# Patient Record
Sex: MALE | Race: WHITE
[De-identification: names, ages, dates, MRNs, and addresses within clinical notes are randomized per-mention and may not be internally consistent; named-entity substitution may affect disease eponyms.]

---

## 2023-03-23 ENCOUNTER — HOSPITAL ENCOUNTER (INPATIENT)
Dept: HOSPITAL 47 - EC | Age: 73
LOS: 2 days | Discharge: HOME | DRG: 180 | End: 2023-03-25
Attending: INTERNAL MEDICINE | Admitting: INTERNAL MEDICINE
Payer: MEDICARE

## 2023-03-23 DIAGNOSIS — Z79.899: ICD-10-CM

## 2023-03-23 DIAGNOSIS — Z77.090: ICD-10-CM

## 2023-03-23 DIAGNOSIS — C79.31: ICD-10-CM

## 2023-03-23 DIAGNOSIS — G93.6: ICD-10-CM

## 2023-03-23 DIAGNOSIS — Z86.73: ICD-10-CM

## 2023-03-23 DIAGNOSIS — J44.9: ICD-10-CM

## 2023-03-23 DIAGNOSIS — J90: ICD-10-CM

## 2023-03-23 DIAGNOSIS — C34.90: Primary | ICD-10-CM

## 2023-03-23 DIAGNOSIS — Z79.82: ICD-10-CM

## 2023-03-23 DIAGNOSIS — I08.1: ICD-10-CM

## 2023-03-23 LAB
ALBUMIN SERPL-MCNC: 3.9 G/DL (ref 3.5–5)
ALP SERPL-CCNC: 105 U/L (ref 38–126)
ALT SERPL-CCNC: 14 U/L (ref 4–49)
ANION GAP SERPL CALC-SCNC: 7 MMOL/L
APTT BLD: 25.4 SEC (ref 22–30)
AST SERPL-CCNC: 26 U/L (ref 17–59)
BASOPHILS # BLD AUTO: 0.1 K/UL (ref 0–0.2)
BASOPHILS NFR BLD AUTO: 1 %
BUN SERPL-SCNC: 11 MG/DL (ref 9–20)
CALCIUM SPEC-MCNC: 8.7 MG/DL (ref 8.4–10.2)
CHLORIDE SERPL-SCNC: 106 MMOL/L (ref 98–107)
CO2 SERPL-SCNC: 23 MMOL/L (ref 22–30)
EOSINOPHIL # BLD AUTO: 0.2 K/UL (ref 0–0.7)
EOSINOPHIL NFR BLD AUTO: 2 %
ERYTHROCYTE [DISTWIDTH] IN BLOOD BY AUTOMATED COUNT: 4.81 M/UL (ref 4.3–5.9)
ERYTHROCYTE [DISTWIDTH] IN BLOOD: 13.6 % (ref 11.5–15.5)
GLUCOSE SERPL-MCNC: 101 MG/DL (ref 74–99)
HCT VFR BLD AUTO: 44.9 % (ref 39–53)
HGB BLD-MCNC: 14.7 GM/DL (ref 13–17.5)
INR PPP: 1 (ref ?–1.2)
LYMPHOCYTES # SPEC AUTO: 2.2 K/UL (ref 1–4.8)
LYMPHOCYTES NFR SPEC AUTO: 24 %
MCH RBC QN AUTO: 30.6 PG (ref 25–35)
MCHC RBC AUTO-ENTMCNC: 32.8 G/DL (ref 31–37)
MCV RBC AUTO: 93.4 FL (ref 80–100)
MONOCYTES # BLD AUTO: 0.6 K/UL (ref 0–1)
MONOCYTES NFR BLD AUTO: 6 %
NEUTROPHILS # BLD AUTO: 5.9 K/UL (ref 1.3–7.7)
NEUTROPHILS NFR BLD AUTO: 65 %
PLATELET # BLD AUTO: 293 K/UL (ref 150–450)
POTASSIUM SERPL-SCNC: 4.6 MMOL/L (ref 3.5–5.1)
PROT SERPL-MCNC: 7.4 G/DL (ref 6.3–8.2)
PT BLD: 10.2 SEC (ref 9–12)
SODIUM SERPL-SCNC: 136 MMOL/L (ref 137–145)
WBC # BLD AUTO: 9.1 K/UL (ref 3.8–10.6)

## 2023-03-23 PROCEDURE — 94760 N-INVAS EAR/PLS OXIMETRY 1: CPT

## 2023-03-23 PROCEDURE — 74177 CT ABD & PELVIS W/CONTRAST: CPT

## 2023-03-23 PROCEDURE — 76604 US EXAM CHEST: CPT

## 2023-03-23 PROCEDURE — 80048 BASIC METABOLIC PNL TOTAL CA: CPT

## 2023-03-23 PROCEDURE — 80306 DRUG TEST PRSMV INSTRMNT: CPT

## 2023-03-23 PROCEDURE — 70450 CT HEAD/BRAIN W/O DYE: CPT

## 2023-03-23 PROCEDURE — 96374 THER/PROPH/DIAG INJ IV PUSH: CPT

## 2023-03-23 PROCEDURE — 81003 URINALYSIS AUTO W/O SCOPE: CPT

## 2023-03-23 PROCEDURE — 83036 HEMOGLOBIN GLYCOSYLATED A1C: CPT

## 2023-03-23 PROCEDURE — 36415 COLL VENOUS BLD VENIPUNCTURE: CPT

## 2023-03-23 PROCEDURE — 80053 COMPREHEN METABOLIC PANEL: CPT

## 2023-03-23 PROCEDURE — 70553 MRI BRAIN STEM W/O & W/DYE: CPT

## 2023-03-23 PROCEDURE — 84484 ASSAY OF TROPONIN QUANT: CPT

## 2023-03-23 PROCEDURE — 71260 CT THORAX DX C+: CPT

## 2023-03-23 PROCEDURE — 93306 TTE W/DOPPLER COMPLETE: CPT

## 2023-03-23 PROCEDURE — 70498 CT ANGIOGRAPHY NECK: CPT

## 2023-03-23 PROCEDURE — 93005 ELECTROCARDIOGRAM TRACING: CPT

## 2023-03-23 PROCEDURE — 80061 LIPID PANEL: CPT

## 2023-03-23 PROCEDURE — 85730 THROMBOPLASTIN TIME PARTIAL: CPT

## 2023-03-23 PROCEDURE — 85025 COMPLETE CBC W/AUTO DIFF WBC: CPT

## 2023-03-23 PROCEDURE — 70496 CT ANGIOGRAPHY HEAD: CPT

## 2023-03-23 PROCEDURE — 95816 EEG AWAKE AND DROWSY: CPT

## 2023-03-23 PROCEDURE — 85610 PROTHROMBIN TIME: CPT

## 2023-03-23 PROCEDURE — 99291 CRITICAL CARE FIRST HOUR: CPT

## 2023-03-23 PROCEDURE — 83605 ASSAY OF LACTIC ACID: CPT

## 2023-03-23 PROCEDURE — 84153 ASSAY OF PSA TOTAL: CPT

## 2023-03-23 PROCEDURE — 71046 X-RAY EXAM CHEST 2 VIEWS: CPT

## 2023-03-23 NOTE — ED
General Adult HPI





- General


Chief complaint: Neuro Symptoms/Deficit


Stated complaint: Possible stroke


Time Seen by Provider: 03/23/23 21:29


Source: patient, family, RN notes reviewed, old records reviewed


Mode of arrival: ambulatory


Limitations: no limitations





- History of Present Illness


Initial comments: 





Patient is a 72-year-old male who presents emergency Department complaining of 

2-3 weeks of weakness, or seizures on the right side of his body.  States he has

numbness in his right leg and right arm.  His neighbor and PCP Dr. Medel called

me today and updated me on the patient's arrival.  Concern for possible stroke 

that it seems to have been ongoing for last few weeks.  I spoke with the patient

as well as family, and he has beendragging his right lower extremity, and has 

been feeling paresthesias in his right upper extremity and right lower 

extremity.  Feels weak in the right upper extremity and right lower extremity.  

Denies any confusion.  Denies any falls.  He is not on blood thinners.  Denies 

any prior strokes.  Denies any cardiac disease.  His no other acute complaints 

at this time.  States he flew back from [place] late last month, and states that

after returning, he began having the weakness and the first few days of this m

onth.  Patient also had what he describes convulsions at home.  States that he 

had uncontrollable movements of the right arm and right leg prior to arrival 

which prompted them to come the emergency department.  States he did not have 

other movements.  States he did not have any injuries.  Denies falling or 

hitting his head or loss consciousness.  Presents for further evaluation at this

time.





- Related Data


                                    Allergies











Allergy/AdvReac Type Severity Reaction Status Date / Time


 


No Known Allergies Allergy   Verified 03/24/23 00:07














Review of Systems


ROS Statement: 


Those systems with pertinent positive or pertinent negative responses have been 

documented in the HPI.


Review of Systems:


CONST: Denies fever 


EYES: Denies blurry vision 


ENT: Denies nasal congestion  


C/V:  Denies Chest pain


RESP: Denies shortness of breath 


GI: Denies abdominal pain 


: Denies dysuria  


SKIN: Denies rash.


MSK: Denies joint pain.


NEURO: Endorses decreased sensation as well as weakness in the right upper 

extremity and right lower extremity.


ROS Other: All systems not noted in ROS Statement are negative.





Past Medical History


Past Medical History: No Reported History


Past Surgical History: No Surgical Hx Reported


Smoking Status: Former smoker


Past Alcohol Use History: None Reported


Past Drug Use History: None Reported





General Exam





- General Exam Comments


Initial Comments: 





General: Appears in no acute distress.


HEAD:  Normal with no signs of head trauma.


EYES:  PERRLA, EOMI, conjunctiva normal, no discharge.  Pupils are 3 mm and 

equal bilaterally.


ENT:  Hearing grossly intact, normal oropharynx.


RESPIRATORY:  Clear breath sounds bilaterally.  No wheezes, rales, or rhonchi.  


C/V:  Regular rate and rhythm. S1 and S2 auscultated, no edema, peripheral 

pulses 2+ and intact throughout


ABD:  Abd is soft, nontender, nondistended


EXT: Normal range of motion, no obvious deformity


SKIN:  No rashes or lesions observed on exposed skin.


NEURO: Alert and oriented 4.  Cranial nerves II through XII are intact.  NIH is

approximately 3 for mild drift in the right upper extremity and right lower 

extremity.  Also for the mild sensation loss.  GCS of 15.  Last known well was 

2-3 weeks ago.


Limitations: no limitations





Course


                                   Vital Signs











  03/23/23





  21:20


 


Temperature 97.8 F


 


Pulse Rate 87


 


Respiratory 14





Rate 


 


Blood Pressure 122/72


 


O2 Sat by Pulse 98





Oximetry 














Medical Decision Making





- Medical Decision Making





Was pt. sent in by a medical professional or institution (, PA, NP, urgent 

care, hospital, or nursing home...) When possible be specific


@  -No


Did you speak to anyone other than the patient for history (EMS, parent, family,

police, friend...)? What history was obtained from this source 


@  -Yes, patient's PCP Dr. Oseguera who called ahead and informed me of the 

concern for stroke.


Did you review nursing and triage notes (agree or disagree)?  Why? 


@  -I reviewed and agree with nursing and triage notes


Were old charts reviewed (outside hosp., previous admission, EMS record, old EKG

, old radiological studies, urgent care reports/EKG's, nursing home records)? 

Report findings 


@  -No old charts were reviewed


Differential Diagnosis (chest pain, altered mental status, abdominal pain women,

abdominal pain men, vaginal bleeding, weakness, fever, dyspnea, syncope, 

headache, dizziness, GI bleed, back pain, seizure, CVA, palpatations, mental 

health, musculoskeletal)? 


@  -Differential CVA


Ischemic stroke, hemorrhagic stroke, brain tumor, atypical migraine, Wernicke's 

encephalopathy, seizure, multiple sclerosis, meningitis, encephalitis, 

hypoglycemia, Guillain-Barr, electrolytes disturbance, myasthenia gravis.... 

This is not meant to be an all-inclusive list


EKG interpreted by me (3pts min.).


@  -As above


X-rays interpreted by me (1pt min.).


@  -Chest x-ray does show findings concerning for possible mass.  There is also 

right-sided pleural effusion or obvious expenses fibrotic changes.


CT interpreted by me (1pt min.).


@  -CT brain and CT angiogram of the head and neck revealed what appears to be a

large stroke is likely old seen on the left parietal region.  CT angiogram shows

what could also be a small mass located near this area as well.  This does fit 

with the patient's symptoms of weakness and sensory deficits on the right upper 

and right lower extremities


U/S interpreted by me (1pt. min.).


@  -None done


What testing was considered but not performed or refused? (CT, X-rays, U/S, 

labs)? Why?


@  -None


What meds were considered but not given or refused? Why?


@  -None


Did you discuss the management of the patient with other professionals 

(professionals i.e. , PA, NP, lab, RT, psych nurse, , , 

teacher, , )? Give summary


@  -Discussed with patient's physician Dr. Medel, who requested admission to 

[facility] group.  I did initially speak with the [facility] call team, EM H 

however updated them that the patient will be admitted to [facility] for the 

patient's primary team's request.  There are were in agreement with this plan.  

I spoke to [facility] physician group, Dr. Valencia who accepted the patient.


Was smoking cessation discussed for >3mins.?


@  -No


Was critical care preformed (if so, how long)?


@  -Yes, 35 minutes.


Were there social determinants of health that impacted care today? How? 

(Homelessness, low income, unemployed, alcoholism, drug addiction, 

transportation, low edu. Level, literacy, decrease access to med. care, residential, 

rehab)?


@  -No


Was there de-escalation of care discussed even if they declined (Discuss DNR or 

withdrawal of care, Hospice)? DNR status


@  -No


What co-morbidities impacted this encounter? (DM, HTN, Smoking, COPD, CAD, 

Cancer, CVA, ARF, Chemo, Hep., AIDS, mental health diagnosis, sleep apnea, mo

rbid obesity)?


@  -None


Was patient admitted / discharged? Hospital course, mention meds given and 

route, prescriptions, significant lab abnormalities, going to OR and other 

pertinent info.


@  -Based on the patient's presentation and physical exam, patient has been 

having multiple weeks of neurological symptoms and there is concern for stroke 

with an NIH of 3.  Last known well was multiple weeks ago.  He is outside the 

TPA window, and any benefits of administration of TPA is far outweighed by the 

risks.  We will obtain stroke workup.  He was in agreement with this plan.  

Vital signs within acceptable limits.





EKG shows no signs of acute ischemia.Chest x-ray findings show possible mass.  

Chronic fibrotic changes.  CT imaging remarkable for left parietal older stroke,

as well as a possible mass located there as well.  Labs were remarkable for no 

acute findings.





I spoke with Dr. Medel about the results and he was in agreement with admission

for neurology and pulmonology evaluation.  As the patient just received IV 

contrast, we will provide him with a fluid bolus and patient will be given 325 

mg of aspirin empirically treated with IV Keppra over concern for possible 

seizure-like activity with the convulsions earlier this evening likely secondary

to stroke.  He was otherwise in agreement with admission.  City call admission i

s EMS but the patient's PCP Dr. Oseguera does request that I admitted under sounds

physician group.  This changes made and physicians were notified.  I consulted 

neurology as well as pulmonology, Dr. Frances for evaluation of the patient.  I 

did the patient as well as family members.  I answered all questions that they 

had.  They were in agreement this plan.  I spoke with the admitting physician, 

Dr. Valencia who accepted the patient, and was in agreement with placing the 

order for CT of the chest after patient has been treated with IV fluids and 

repeat labs obtained in the morning.








Undiagnosed new problem with uncertain prognosis?


@  -Yes, possible malignancy


Drug Therapy requiring intensive monitoring for toxicity (Heparin, Nitro, Insul

in, Cardizem)?


@  -No


Were any procedures done?


@  -No





Diagnosis/symptom?


@  -Left-sided parietal stroke


Acute, or Chronic, or Acute on Chronic?


@  -Subacute versus chronic


Uncomplicated (without systemic symptoms) or Complicated (systemic symptoms)?


@  -Complicated


Side effects of treatment?


@  -none


Exacerbation, Progression, or Severe Exacerbation]


@  -no


Poses a threat to life or bodily function?


@  -no





Diagnosis/symptom?


@  -Possible malignancy


Acute, or Chronic, or Acute on Chronic?


@  -Acute


Uncomplicated (without systemic symptoms) or Complicated (systemic symptoms)?


@  -Uncomplicated


Side effects of treatment?


@  -none


Exacerbation, Progression, or Severe Exacerbation]


@  -no


Poses a threat to life or bodily function?


@  -Potentially.





- Lab Data


Result diagrams: 


                                 03/23/23 22:06





                                 03/23/23 22:06


                                   Lab Results











  03/23/23 03/23/23 03/23/23 Range/Units





  22:06 22:06 22:06 


 


WBC  9.1    (3.8-10.6)  k/uL


 


RBC  4.81    (4.30-5.90)  m/uL


 


Hgb  14.7    (13.0-17.5)  gm/dL


 


Hct  44.9    (39.0-53.0)  %


 


MCV  93.4    (80.0-100.0)  fL


 


MCH  30.6    (25.0-35.0)  pg


 


MCHC  32.8    (31.0-37.0)  g/dL


 


RDW  13.6    (11.5-15.5)  %


 


Plt Count  293    (150-450)  k/uL


 


MPV  7.1    


 


Neutrophils %  65    %


 


Lymphocytes %  24    %


 


Monocytes %  6    %


 


Eosinophils %  2    %


 


Basophils %  1    %


 


Neutrophils #  5.9    (1.3-7.7)  k/uL


 


Lymphocytes #  2.2    (1.0-4.8)  k/uL


 


Monocytes #  0.6    (0-1.0)  k/uL


 


Eosinophils #  0.2    (0-0.7)  k/uL


 


Basophils #  0.1    (0-0.2)  k/uL


 


PT   10.2   (9.0-12.0)  sec


 


INR   1.0   (<1.2)  


 


APTT   25.4   (22.0-30.0)  sec


 


Sodium    136 L  (137-145)  mmol/L


 


Potassium    4.6  (3.5-5.1)  mmol/L


 


Chloride    106  ()  mmol/L


 


Carbon Dioxide    23  (22-30)  mmol/L


 


Anion Gap    7  mmol/L


 


BUN    11  (9-20)  mg/dL


 


Creatinine    0.80  (0.66-1.25)  mg/dL


 


Est GFR (CKD-EPI)AfAm    >90  (>60 ml/min/1.73 sqM)  


 


Est GFR (CKD-EPI)NonAf    90  (>60 ml/min/1.73 sqM)  


 


Glucose    101 H  (74-99)  mg/dL


 


Plasma Lactic Acid Koby     (0.7-2.0)  mmol/L


 


Calcium    8.7  (8.4-10.2)  mg/dL


 


Total Bilirubin    0.9  (0.2-1.3)  mg/dL


 


AST    26  (17-59)  U/L


 


ALT    14  (4-49)  U/L


 


Alkaline Phosphatase    105  ()  U/L


 


Troponin I     (0.000-0.034)  ng/mL


 


Total Protein    7.4  (6.3-8.2)  g/dL


 


Albumin    3.9  (3.5-5.0)  g/dL


 


Urine Color     


 


Urine Appearance     (Clear)  


 


Urine pH     (5.0-8.0)  


 


Ur Specific Gravity     (1.001-1.035)  


 


Urine Protein     (Negative)  


 


Urine Glucose (UA)     (Negative)  


 


Urine Ketones     (Negative)  


 


Urine Blood     (Negative)  


 


Urine Nitrite     (Negative)  


 


Urine Bilirubin     (Negative)  


 


Urine Urobilinogen     (<2.0)  mg/dL


 


Ur Leukocyte Esterase     (Negative)  


 


Urine Opiates Screen     (NotDetected)  


 


Ur Oxycodone Screen     (NotDetected)  


 


Urine Methadone Screen     (NotDetected)  


 


Ur Propoxyphene Screen     (NotDetected)  


 


Ur Barbiturates Screen     (NotDetected)  


 


U Tricyclic Antidepress     (NotDetected)  


 


Ur Phencyclidine Scrn     (NotDetected)  


 


Ur Amphetamines Screen     (NotDetected)  


 


U Methamphetamines Scrn     (NotDetected)  


 


U Benzodiazepines Scrn     (NotDetected)  


 


Urine Cocaine Screen     (NotDetected)  


 


U Marijuana (THC) Screen     (NotDetected)  














  03/23/23 03/23/23 03/24/23 Range/Units





  22:06 22:06 00:13 


 


WBC     (3.8-10.6)  k/uL


 


RBC     (4.30-5.90)  m/uL


 


Hgb     (13.0-17.5)  gm/dL


 


Hct     (39.0-53.0)  %


 


MCV     (80.0-100.0)  fL


 


MCH     (25.0-35.0)  pg


 


MCHC     (31.0-37.0)  g/dL


 


RDW     (11.5-15.5)  %


 


Plt Count     (150-450)  k/uL


 


MPV     


 


Neutrophils %     %


 


Lymphocytes %     %


 


Monocytes %     %


 


Eosinophils %     %


 


Basophils %     %


 


Neutrophils #     (1.3-7.7)  k/uL


 


Lymphocytes #     (1.0-4.8)  k/uL


 


Monocytes #     (0-1.0)  k/uL


 


Eosinophils #     (0-0.7)  k/uL


 


Basophils #     (0-0.2)  k/uL


 


PT     (9.0-12.0)  sec


 


INR     (<1.2)  


 


APTT     (22.0-30.0)  sec


 


Sodium     (137-145)  mmol/L


 


Potassium     (3.5-5.1)  mmol/L


 


Chloride     ()  mmol/L


 


Carbon Dioxide     (22-30)  mmol/L


 


Anion Gap     mmol/L


 


BUN     (9-20)  mg/dL


 


Creatinine     (0.66-1.25)  mg/dL


 


Est GFR (CKD-EPI)AfAm     (>60 ml/min/1.73 sqM)  


 


Est GFR (CKD-EPI)NonAf     (>60 ml/min/1.73 sqM)  


 


Glucose     (74-99)  mg/dL


 


Plasma Lactic Acid Koby   1.3   (0.7-2.0)  mmol/L


 


Calcium     (8.4-10.2)  mg/dL


 


Total Bilirubin     (0.2-1.3)  mg/dL


 


AST     (17-59)  U/L


 


ALT     (4-49)  U/L


 


Alkaline Phosphatase     ()  U/L


 


Troponin I  <0.012    (0.000-0.034)  ng/mL


 


Total Protein     (6.3-8.2)  g/dL


 


Albumin     (3.5-5.0)  g/dL


 


Urine Color     


 


Urine Appearance     (Clear)  


 


Urine pH     (5.0-8.0)  


 


Ur Specific Gravity     (1.001-1.035)  


 


Urine Protein     (Negative)  


 


Urine Glucose (UA)     (Negative)  


 


Urine Ketones     (Negative)  


 


Urine Blood     (Negative)  


 


Urine Nitrite     (Negative)  


 


Urine Bilirubin     (Negative)  


 


Urine Urobilinogen     (<2.0)  mg/dL


 


Ur Leukocyte Esterase     (Negative)  


 


Urine Opiates Screen    Not Detected  (NotDetected)  


 


Ur Oxycodone Screen    Not Detected  (NotDetected)  


 


Urine Methadone Screen    Not Detected  (NotDetected)  


 


Ur Propoxyphene Screen    Not Detected  (NotDetected)  


 


Ur Barbiturates Screen    Not Detected  (NotDetected)  


 


U Tricyclic Antidepress    Not Detected  (NotDetected)  


 


Ur Phencyclidine Scrn    Not Detected  (NotDetected)  


 


Ur Amphetamines Screen    Not Detected  (NotDetected)  


 


U Methamphetamines Scrn    Not Detected  (NotDetected)  


 


U Benzodiazepines Scrn    Not Detected  (NotDetected)  


 


Urine Cocaine Screen    Not Detected  (NotDetected)  


 


U Marijuana (THC) Screen    Not Detected  (NotDetected)  














  03/24/23 Range/Units





  00:13 


 


WBC   (3.8-10.6)  k/uL


 


RBC   (4.30-5.90)  m/uL


 


Hgb   (13.0-17.5)  gm/dL


 


Hct   (39.0-53.0)  %


 


MCV   (80.0-100.0)  fL


 


MCH   (25.0-35.0)  pg


 


MCHC   (31.0-37.0)  g/dL


 


RDW   (11.5-15.5)  %


 


Plt Count   (150-450)  k/uL


 


MPV   


 


Neutrophils %   %


 


Lymphocytes %   %


 


Monocytes %   %


 


Eosinophils %   %


 


Basophils %   %


 


Neutrophils #   (1.3-7.7)  k/uL


 


Lymphocytes #   (1.0-4.8)  k/uL


 


Monocytes #   (0-1.0)  k/uL


 


Eosinophils #   (0-0.7)  k/uL


 


Basophils #   (0-0.2)  k/uL


 


PT   (9.0-12.0)  sec


 


INR   (<1.2)  


 


APTT   (22.0-30.0)  sec


 


Sodium   (137-145)  mmol/L


 


Potassium   (3.5-5.1)  mmol/L


 


Chloride   ()  mmol/L


 


Carbon Dioxide   (22-30)  mmol/L


 


Anion Gap   mmol/L


 


BUN   (9-20)  mg/dL


 


Creatinine   (0.66-1.25)  mg/dL


 


Est GFR (CKD-EPI)AfAm   (>60 ml/min/1.73 sqM)  


 


Est GFR (CKD-EPI)NonAf   (>60 ml/min/1.73 sqM)  


 


Glucose   (74-99)  mg/dL


 


Plasma Lactic Acid Koby   (0.7-2.0)  mmol/L


 


Calcium   (8.4-10.2)  mg/dL


 


Total Bilirubin   (0.2-1.3)  mg/dL


 


AST   (17-59)  U/L


 


ALT   (4-49)  U/L


 


Alkaline Phosphatase   ()  U/L


 


Troponin I   (0.000-0.034)  ng/mL


 


Total Protein   (6.3-8.2)  g/dL


 


Albumin   (3.5-5.0)  g/dL


 


Urine Color  Yellow  


 


Urine Appearance  Clear  (Clear)  


 


Urine pH  6.5  (5.0-8.0)  


 


Ur Specific Gravity  1.038 H  (1.001-1.035)  


 


Urine Protein  Negative  (Negative)  


 


Urine Glucose (UA)  Negative  (Negative)  


 


Urine Ketones  Negative  (Negative)  


 


Urine Blood  Negative  (Negative)  


 


Urine Nitrite  Negative  (Negative)  


 


Urine Bilirubin  Negative  (Negative)  


 


Urine Urobilinogen  <2.0  (<2.0)  mg/dL


 


Ur Leukocyte Esterase  Negative  (Negative)  


 


Urine Opiates Screen   (NotDetected)  


 


Ur Oxycodone Screen   (NotDetected)  


 


Urine Methadone Screen   (NotDetected)  


 


Ur Propoxyphene Screen   (NotDetected)  


 


Ur Barbiturates Screen   (NotDetected)  


 


U Tricyclic Antidepress   (NotDetected)  


 


Ur Phencyclidine Scrn   (NotDetected)  


 


Ur Amphetamines Screen   (NotDetected)  


 


U Methamphetamines Scrn   (NotDetected)  


 


U Benzodiazepines Scrn   (NotDetected)  


 


Urine Cocaine Screen   (NotDetected)  


 


U Marijuana (THC) Screen   (NotDetected)  














- EKG Data


-: EKG Interpreted by Me


EKG Comments: 





12-lead Electrocardiogram Interpretation Note





EKG was reviewed and interpreted by myself. 12-lead ECG performed at 2206 is 

interpreted by me as revealing normal sinus rhythm at a rate of 69 beats per 

minute.  Axis is normal.  WI interval is 158 ms, QRS duration is 91 ms, QTc is 

400 ms..  There were no ST or T wave abnormalities to suggest myocardial 

ischemia or injury. R wave progression across the precordium was satisfactory. 

By my interpretation this EKG is non-diagnostic for acute ischemia.





Critical Care Time


Critical Care Time: Yes


Total Critical Care Time: 35


Critical Care Time: 





Upon my evaluation, this patient had a high probability of imminent or life-

threatening deterioration due to stroke, possible malignancy, which required my 

direct attention, intervention, and personal management. 


I have personally provided 35 minutes of critical care time exclusive of time 

spent on separately billable procedures. Time includes review of laboratory 

data, radiology results, discussion with consultants, and monitoring for 

potential decompensation. Interventions were performed as documented in my note.





Disposition


Clinical Impression: 


 Cerebrovascular accident (CVA), Concern about cancer without diagnosis





Disposition: ADMITTED AS IP TO THIS [facility]


Condition: Stable


Referrals: 


Anthony Oseguera MD [Primary Care Provider] - 1-2 days


Time of Disposition: 00:25

## 2023-03-23 NOTE — XR
EXAMINATION TYPE: XR chest 2V

 

DATE OF EXAM: 3/23/2023

 

COMPARISON: NONE

 

HISTORY: Altered mental status

 

TECHNIQUE: 2 views

 

FINDINGS: There is moderate coarse interstitial density throughout the lungs. There is mild flattenin
g of the diaphragm. There is pleural thickening on the right posterior chest wall. Heart size is norm
al. Thoracic aorta is atheromatous. There is osteopenia.

 

IMPRESSION: Extensive pulmonary fibrotic changes. Small right pleural effusion. Pleural thickening ri
ght posterior chest wall and follow-up recommended. Pleural or pulmonary mass is possible. Follow-up 
recommended. This masslike area measures 6 cm.

## 2023-03-24 LAB
ANION GAP SERPL CALC-SCNC: 6 MMOL/L
BASOPHILS # BLD AUTO: 0.1 K/UL (ref 0–0.2)
BASOPHILS NFR BLD AUTO: 1 %
BUN SERPL-SCNC: 9 MG/DL (ref 9–20)
CALCIUM SPEC-MCNC: 8.4 MG/DL (ref 8.4–10.2)
CHLORIDE SERPL-SCNC: 108 MMOL/L (ref 98–107)
CO2 SERPL-SCNC: 24 MMOL/L (ref 22–30)
EOSINOPHIL # BLD AUTO: 0.2 K/UL (ref 0–0.7)
EOSINOPHIL NFR BLD AUTO: 3 %
ERYTHROCYTE [DISTWIDTH] IN BLOOD BY AUTOMATED COUNT: 4.51 M/UL (ref 4.3–5.9)
ERYTHROCYTE [DISTWIDTH] IN BLOOD: 14 % (ref 11.5–15.5)
GLUCOSE SERPL-MCNC: 98 MG/DL (ref 74–99)
HCT VFR BLD AUTO: 42.3 % (ref 39–53)
HGB BLD-MCNC: 14.3 GM/DL (ref 13–17.5)
LYMPHOCYTES # SPEC AUTO: 1.9 K/UL (ref 1–4.8)
LYMPHOCYTES NFR SPEC AUTO: 28 %
MCH RBC QN AUTO: 31.8 PG (ref 25–35)
MCHC RBC AUTO-ENTMCNC: 33.8 G/DL (ref 31–37)
MCV RBC AUTO: 93.9 FL (ref 80–100)
MONOCYTES # BLD AUTO: 0.4 K/UL (ref 0–1)
MONOCYTES NFR BLD AUTO: 6 %
NEUTROPHILS # BLD AUTO: 4.2 K/UL (ref 1.3–7.7)
NEUTROPHILS NFR BLD AUTO: 60 %
PH UR: 6.5 [PH] (ref 5–8)
PLATELET # BLD AUTO: 273 K/UL (ref 150–450)
POTASSIUM SERPL-SCNC: 4.2 MMOL/L (ref 3.5–5.1)
SODIUM SERPL-SCNC: 138 MMOL/L (ref 137–145)
SP GR UR: 1.04 (ref 1–1.03)
UROBILINOGEN UR QL STRIP: <2 MG/DL (ref ?–2)
WBC # BLD AUTO: 7 K/UL (ref 3.8–10.6)

## 2023-03-24 PROCEDURE — B246ZZ4 ULTRASONOGRAPHY OF RIGHT AND LEFT HEART, TRANSESOPHAGEAL: ICD-10-PCS

## 2023-03-24 PROCEDURE — 4A10X4Z MONITORING OF CENTRAL NERVOUS ELECTRICAL ACTIVITY, EXTERNAL APPROACH: ICD-10-PCS

## 2023-03-24 RX ADMIN — ASPIRIN 81 MG CHEWABLE TABLET SCH MG: 81 TABLET CHEWABLE at 09:14

## 2023-03-24 RX ADMIN — CEFAZOLIN SCH MLS/HR: 330 INJECTION, POWDER, FOR SOLUTION INTRAMUSCULAR; INTRAVENOUS at 05:59

## 2023-03-24 RX ADMIN — HEPARIN SODIUM SCH UNIT: 5000 INJECTION INTRAVENOUS; SUBCUTANEOUS at 09:14

## 2023-03-24 RX ADMIN — CEFAZOLIN SCH: 330 INJECTION, POWDER, FOR SOLUTION INTRAMUSCULAR; INTRAVENOUS at 12:18

## 2023-03-24 RX ADMIN — DEXAMETHASONE SODIUM PHOSPHATE SCH MG: 4 INJECTION, SOLUTION INTRAMUSCULAR; INTRAVENOUS at 23:15

## 2023-03-24 RX ADMIN — DEXAMETHASONE SODIUM PHOSPHATE SCH MG: 4 INJECTION, SOLUTION INTRAMUSCULAR; INTRAVENOUS at 12:18

## 2023-03-24 RX ADMIN — HEPARIN SODIUM SCH UNIT: 5000 INJECTION INTRAVENOUS; SUBCUTANEOUS at 17:19

## 2023-03-24 RX ADMIN — HEPARIN SODIUM SCH UNIT: 5000 INJECTION INTRAVENOUS; SUBCUTANEOUS at 23:15

## 2023-03-24 RX ADMIN — DEXAMETHASONE SODIUM PHOSPHATE SCH MG: 4 INJECTION, SOLUTION INTRAMUSCULAR; INTRAVENOUS at 17:19

## 2023-03-24 NOTE — P.PN
Progress Note - Text


Progress Note Date: 03/24/23





Patient came in last night.  He came in for possible seizure-like activity.  He 

was found to have a mass in his brain with some vasogenic edema that is 

concerning for metastasis.  Patient's chest x-ray was also concerning for 

possible mass.  In the ED he was loaded with Keppra and started on Keppra 500 mg

twice a day.  I reviewed note from pulmonology.  Pulmonology is recommending CT 

abdomen and pelvis and chest with contrast.  Unfortunately this test cannot be 

done until tomorrow morning since patient had CTA head and neck done this 

morning.  Pulmonology also started the patient IV Decadron.  Patient is anxious 

to go home.  I told patient that we are still waiting for further workup to be 

done.  Neurology consult is pending.

## 2023-03-24 NOTE — P.CNNES
History of Present Illness


Consult date: 03/24/23


Requesting physician: Christiano Bardales


Reason for Consult: Stroke, possible mass


History of Present Illness: 





Patient is a 72-year-old male came to the hospital yesterday at 9:17 PM





Vital signs arrival blood pressure 122/72, pulse rate 87 temperature 97.8.  

Blood test shows normal CBC, PT/PTT, normal CMP.  Hemoglobin A1c 6.0, troponin 

negative, UA, urine drug screen are normal.





CT head revealed large area of hypodensity consistent with old infarct in the 

left posterior parietal and occipital lobe.  No hemorrhage, maxillary sinusitis.

 No mass effect.  I personally reviewed CT head, and it is possible vasogenic e

juice versus chronic infarct.  Chest x-ray revealed extensive pulmonary fibrotic 

changes.  Small right pleural effusion.  Pleural thickening right posterior 

chest wall and follow-up recommended.  Pleural or pulmonary mass is possible.  

This masslike area measures 6 cm.  Chest ultrasound revealed pleural effusion 

4.5 cm.





Review of Systems


Constitutional: Denies chills, Denies fever


Eyes: denies blurred vision, denies diplopia, denies pain


Ears: deny: decreased hearing, ear discharge


Ears, nose, mouth and throat: Reports post-nasal drip, Reports sinus pain, 

Denies headache, Denies sore throat


Cardiovascular: Reports dyspnea on exertion, Denies chest pain, Denies shortness

of breath


Respiratory: Reports cough, Reports excessive sputum, Denies dyspnea


Gastrointestinal: Denies abdominal pain, Denies diarrhea, Denies nausea, Denies 

vomiting


Musculoskeletal: Reports gait dysfunction, Denies myalgias, Denies neck pain


Integumentary: Denies pruritus, Denies rash


Neurological: Reports as per HPI


Psychiatric: Reports depression, Denies anxiety


Endocrine: Denies fatigue, Denies weight change


Hematologic/Lymphatic: Denies easy bruising





Past Medical History


Past Medical History: No Reported History


History of Any Multi-Drug Resistant Organisms: None Reported


Past Surgical History: No Surgical Hx Reported


Past Anesthesia/Blood Transfusion Reactions: No Reported Reaction


Smoking Status: Former smoker


Past Alcohol Use History: None Reported


Past Drug Use History: None Reported





- Past Family History


  ** Mother


Family Medical History: No Reported History





  ** Father


Family Medical History: No Reported History





Medications and Allergies


                                Home Medications











 Medication  Instructions  Recorded  Confirmed  Type


 


Latanoprost Ophth [Xalatan 0.005%] 1 drop BOTH EYES HS 03/24/23 03/24/23 History








                                    Allergies











Allergy/AdvReac Type Severity Reaction Status Date / Time


 


No Known Allergies Allergy   Verified 03/24/23 07:35














Physical Examination





- Vital Signs


Vital Signs: 


                                   Vital Signs











  Temp Pulse Pulse Resp BP BP Pulse Ox


 


 03/24/23 10:01        94 L


 


 03/24/23 08:00  98.2 F   57 L  18   119/71  97


 


 03/24/23 02:15  98.1 F   71  18   118/67  95


 


 03/24/23 01:36  98.4 F  67   18  130/81   95


 


 03/23/23 21:20  97.8 F  87   14  122/72   98








                                Intake and Output











 03/23/23 03/24/23 03/24/23





 22:59 06:59 14:59


 


Intake Total   0


 


Balance   0


 


Intake:   


 


  Oral   0


 


Other:   


 


  # Voids   1


 


  Weight 63.503 kg 63.503 kg 














Results





- Laboratory Findings


CBC and BMP: 


                                 03/24/23 08:32





                                 03/24/23 08:32


Abnormal Lab Findings: 


                                  Abnormal Labs











  03/23/23 03/24/23 03/24/23





  22:06 00:13 08:32


 


Sodium  136 L  


 


Chloride    108 H


 


Glucose  101 H  


 


Ur Specific Gravity   1.038 H 














Assessment and Plan


Assessment: 





* 72-year-old male presenting with 2 week history of right-sided weakness, and 

  an episode of focal seizure lasting for 1.5 minutes.  CT head showed possibil

  ity of mass with vasogenic edema.  Rule out timely versus metastatic.


* Tobacco use














Plan: 





* MRI of the brain with and without contrast


* EEG evaluate for epileptiform activity


* CTA of head and neck is normal.  There is a small ring enhancing lesion at the

  left posterior parietal convexity that is suspicious for metastatic disease.  

  There is surrounding edema without significant mass effect. 


* Further management based upon above test results.


* Neurology will follow.  Thank you for the consult.

## 2023-03-24 NOTE — US
EXAMINATION TYPE: US chest

 

DATE OF EXAM: 3/24/2023

 

COMPARISON: NONE

 

CLINICAL HISTORY: Markings for thoracentesis by pulmonary staff. Mild pleural effusion seen on xray, 
mass also noted on Ct and xray

 

TECHNIQUE:  Targeted ultrasound of the posterior lower Chest

 

EXAM MEASUREMENTS:

 

Right Pleural Effusion pocket size:  4.5cm - pleural space is not clear free fluid, masslike effect v
ersus thick fluid with solid components - not marked

**  Right skin surface to fluid distance:  3.0 cm

 

Left Pleural Effusion pocket size:  0 cm

 

 

Right side NOT marked 

 

Left side NOT marked 

 

Pulmonologists are able to review the images in the patient?s EMR.  

 

 

 

 

 

IMPRESSIONS: As above

## 2023-03-24 NOTE — P.HPIM
History of Present Illness


H&P Date: 03/24/23


Chief Complaint: seizure like activity 





72 year old male denies any past medical history 





he has not seen a doctor for past 12 years, he was caring for his dying wife. 

who passed away summer of last year





he was at his baseline status of health up until 3 weeks ago  , when he came 

back from a trip to [place], few days later he noticed some right sided weakness

and numbness. he ignored it, he claims it was on a come and go basis. 





last night around 830 pm , he started having vigorous jerking of his right upper

and lower extremity , lasted for a min or so , no loss of consciousness , he was

aware of the whole event, he was in bed resting, denies any head injury , denies

any tongue biting or loss of bladder or bowel control 





with all the above, he denies any other focal neuro deficits, denies any 

headache, changes in vision or hearing , denies any changes in speech. denies 

any falls. 





he does admit to smoking, but denies any illicit drugs or alcohol 





he denies any coughing, hemoptysis or weight loss. 





Review of Systems








Pertinent positives as noted in HPI. All other systems were reviewed and are 

negative











Past Medical History


Past Medical History: No Reported History


Past Surgical History: No Surgical Hx Reported


Smoking Status: Former smoker


Past Alcohol Use History: None Reported


Past Drug Use History: None Reported





- Past Family History


  ** Mother


Family Medical History: No Reported History





  ** Father


Family Medical History: No Reported History





Medications and Allergies


                                    Allergies











Allergy/AdvReac Type Severity Reaction Status Date / Time


 


No Known Allergies Allergy   Verified 03/24/23 00:07














Physical Exam


Vitals: 


                                   Vital Signs











  Temp Pulse Resp BP Pulse Ox


 


 03/23/23 21:20  97.8 F  87  14  122/72  98








                                Intake and Output











 03/23/23 03/23/23 03/24/23





 14:59 22:59 06:59


 


Other:   


 


  Weight  63.503 kg 

















Constitutional:          No acute distress, conversant, pleasant


Eyes:      Anicteric sclerae, moist conjunctiva, 


         Pupils equal round reactive to light





ENMT:      NC/AT


         Oropharynx clear, no erythema, or exudates





Neck:      Supple,  no masses, or JVD


         No carotid bruits


         No thyromegaly





Lungs:      good breath sounds, with some rales over right lung base and mid 

lung 


         Clear to percussion


         Normal respiratory effort, no accessory muscle use 





Cardiovascular:      Heart regular in rate and rhythm, 


         No murmurs, gallops, or rubs


         No peripheral edema





Abdominal:       Soft


         Nontender, no guarding, rebound or rigidity


         Abdomen moving with respiration


         Normoactive bowel sounds


         No hepatomegaly, No splenomegaly


         No palpable mass 


         No abdominal wall hernia noted 





Skin:      yellow discoloration of his nails, especially over the index and 

middle finger, otherwiseNormal temperature, tone, texture, turgor


         No induration


         No subcutaneous nodules 


         No rash, lesions


         No ulcers





Extremities:      No digital cyanosis 


         positive  clubbing all his fingers


         Pedal pulses intact and symmetrical


         Radial pulses intact and symmetrical 


         No calf tenderness 





Psychiatric:      Alert and oriented to person, place and time


         Appropriate affect


         fair judgement   


      


Neuro      Muscles Strength 5/5 left upper and bilateral lower extremities , 4/5

in right upper extremity


         Sensation to light touch grossly present throughout


         Cranial nerves II-XII grossly intact 


Lymphatics:       no palpable cervical or supraclavicular   lymph nodes  

















Results


CBC & Chem 7: 


                                 03/23/23 22:06





                                 03/23/23 22:06


Labs: 


                  Abnormal Lab Results - Last 24 Hours (Table)











  03/23/23 03/24/23 Range/Units





  22:06 00:13 


 


Sodium  136 L   (137-145)  mmol/L


 


Glucose  101 H   (74-99)  mg/dL


 


Ur Specific Gravity   1.038 H  (1.001-1.035)  














Assessment and Plan


Assessment: 





72 year old male with long history of heavy smoking, presented for evaluation of

seizure like activity last night, and 2 weeks history of right sided weakness 

and numbness. I discussed the case with ED doc, workup showed possible stroke 

and mass in his brain , I accepted the admission for neurologic workup and 

initiating antiseizure meds anticipated length of stay < 2 midnights 





mass like lesion in the brain possible of metastatic cancer in origin 


subacute stroke 


neuro checks 


PT/OT eval 


initiated on Keppra loading dose with 1000 mg IVPB , then 500 mg BID 


neuro eval 


IVF hydration with normal saline 


check CTA of the chest to rule out lung cancer as primary origin 


check PSA


patient has not had colonoscopy 


patient has not seen a doctor in over 12 years


aspirin 81 mg daily 


atorvastatin 20 mg daily 


check lipid panel , A1c, 


check carotid US


fall precautions


seizure precautions 





full code


DVVT PPX heparin sc tid 5000 units

## 2023-03-24 NOTE — MR
EXAMINATION TYPE: MR brain wo/w con

 

DATE OF EXAM: 3/24/2023 4:32 PM

 

CLINICAL INDICATION:Male, 72 years old with history of Brain mass;

 

COMPARISON: CT brain 3/23/2023.

 

TECHNIQUE: Multi planar, multi sequence imaging was performed through the brain including: T1, T2, In
version recovery, susceptibility weighted imaging and gradient echo imaging and Diffusion weighted im
aging. The patient was then given intravenous contrast and multi planar, T1 fat-saturation images wer
e obtained.

IV Contrast: 6.5 cc Gadavist

 

FINDINGS: 

The left posterior parietal region is a isointense to gray matter on both T1 and T2-weighted sequence
s intra-axial mass at the gray-white junction. This does demonstrate restricted diffusion more pronou
nced peripherally with a central dot of nonrestricted diffusion. There is surrounding high T2 vasogen
ic edema. Post contrast imaging demonstrates somewhat heterogenous enhancement. 

 

The ventricular system, basal cisterns appear unremarkable. Diffusion-weighted imaging shows no evide
nce of restricted diffusion to suggest acute/subacute infarct. Intracranial arterial flow voids are m
aintained. Midline structures show no abnormality. 

 

The bone marrow signal is within normal limits. 

Paranasal sinuses and mastoid air cells: Moderate scattered paranasal sinus disease.

Visualized orbits: Bilateral aphakia

 

IMPRESSION:

1. Intra-axial mass in the left parietal-occipital region with surrounding vasogenic edema. This may 
represent a primary glioma versus secondary neoplasm such as metastatic disease which is felt to be l
ess likely given there is a one lesion visualized.  Further workup is recommended. Comparisons with p
riors at outside institution may be of benefit

2. Paranasal sinus disease.

## 2023-03-24 NOTE — CT
EXAMINATION TYPE: CT angio head neck

 

DATE OF EXAM: 3/23/2023

 

COMPARISON: None

 

HISTORY: Neuro deficit, acute, stroke suspected

 

CT DLP: 418.4 mGycm

Automated exposure control for dose reduction was used.

 

CONTRAST: 

Performed with IV Contrast, patient injected with 65ml mL of Isovue 370.

 

 

Images obtained from the aortic arch to the vertex of the brain with the IV contrast.

 

 

 

There is 3-D post processed images. There is normal branching pattern of the great vessels on the aor
tic arch. There is arterial flow in both subclavian arteries. There is arterial flow in the common in
ternal and external carotid arteries bilaterally. There is wide patency of the carotid artery bifurca
tions. There is arterial flow in both vertebral arteries. There is arterial flow in the vertebrobasil
ar artery system.

 

No evidence of carotid or vertebral artery aneurysm or dissection.

 

There is arterial flow in the anterior middle and posterior cerebral arteries bilaterally. No mass ef
fect. No evidence of intracranial aneurysm. No evidence of intracranial hemodynamic arterial stenosis
. There is normal enhancement of the venous sinuses.

 

There is hypodensity left posterior parietal occipital lobe. There is 8 mm ring-enhancing lesion at t
he left posterior parietal convexity. No other ring-enhancing lesions seen.

 

IMPRESSION:

Negative CT angiogram of the neck.

 

There is a small ring-enhancing lesion at the left posterior parietal convexity that is suspicious fo
r metastatic disease. There is surrounding edema without significant mass effect. I do not suspect an
 ischemic infarct.

## 2023-03-24 NOTE — CT
EXAMINATION TYPE: CT brain wo con

 

DATE OF EXAM: 3/23/2023

 

COMPARISON: None

 

HISTORY: Neuro deficit, acute, stroke suspected

 

CT DLP: 1126.2 mGycm

Automated exposure control for dose reduction was used.

 

Images of the brain obtained with no contrast.

 

There is 5 cm area of white matter hypodensity left posterior parietal lobe. No midline shift. No hem
orrhage. No evidence of any significant mass effect. The calvarium is intact.

 

There is opacification of the right maxillary sinus. There is fluid level left maxillary sinus.

 

IMPRESSION:

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
be. No hemorrhage. Maxillary sinusitis. No mass effect.

## 2023-03-24 NOTE — P.CNPUL
History of Present Illness


Consult date: 03/24/23


Requesting physician: Antolin Valencia


Reason for consult: COPD, lung mass, abnormal CXR/CT, other


Chief complaint: Seizure versus CVA.


History of present illness: 





Pulmonary consult dated 03/24/2023.





72-year-old male, doesn't have a primary care physician.  The patient apparently

sees my partner from time to time.  The patient presented to the emergency room 

with complaints of weakness, and either seizure, or CVA on the right side of the

body.  He apparently had numbness in his right leg and right arm area there was 

concern for possible CVA.  Apparently, he's been having some weakness in his 

right lower extremity, he has been dragging his right foot.  The chest x-ray was

done, and showed a possible pneumonia versus mass, and the right lung.  In 

addition, the computed tomography scan of the brain suggested a possible 

metastatic lesion with some vasogenic edema.  The patient will be scheduled for 

computed tomography scan of the chest abdomen and pelvis.  He has been a heavy 

smoker for many years.  He apparently was also exposed to his testis, working 

for [employer].  He apparently has no ALLERGIES, and the only thing he was using at 

home was eyedrops.  CBC is completely normal.  Coagulation studies are normal.  

Sodium 138, potassium 4.2, chlorides 108, CO2 24, BUN 9, and creatinine 0.81.  

Urine was negative.  Drug screen was negative.  Chest x-ray shows extensive 

pulmonary fibrotic changes, small right-sided pleural effusion, pleural 

thickening right posterior chest wall and mass versus infiltrate, right chest.  

Computed tomography scan of the brain shows evidence of large area of 

hypodensity consistent with an old infarct in the left posterior parietal and 

occipital lobe.  There was no hemorrhage.  Angiography CT, showed a small ring 

enhancing lesion at the left posterior parietal convexity, with some surrounding

edema, consistent with metastatic disease.





Review of Systems





REVIEW OF SYSTEMS:  


CONSTITUTIONAL:  [Negative.]


NEUROLOGIC: Weakness, numbness, to the right side of his body.


HEENT:  [ Negative.]


CARDIAC:  [Negative.]


PULMONARY:  [Negative.]


GI:  [Negative.]


:  [Negative.]


RHEUMATOLOGIC: [ Negative.]


IMMUNOLOGIC: [ Negative.]


ENDOCRINE:  [Negative.  ] 


DERMATOLOGIC: [Negative.]








Past Medical History


Past Medical History: No Reported History


History of Any Multi-Drug Resistant Organisms: None Reported


Past Surgical History: No Surgical Hx Reported


Past Anesthesia/Blood Transfusion Reactions: No Reported Reaction


Smoking Status: Former smoker


Past Alcohol Use History: None Reported


Past Drug Use History: None Reported





- Past Family History


  ** Mother


Family Medical History: No Reported History





  ** Father


Family Medical History: No Reported History





Medications and Allergies


                                Home Medications











 Medication  Instructions  Recorded  Confirmed  Type


 


Latanoprost Ophth [Xalatan 0.005%] 1 drop BOTH EYES HS 03/24/23 03/24/23 History








                                    Allergies











Allergy/AdvReac Type Severity Reaction Status Date / Time


 


No Known Allergies Allergy   Verified 03/24/23 07:35














Physical Exam


Osteopathic Statement: *.  No significant issues noted on an osteopathic 

structural exam other than those noted in the History and Physical/Consult.


Vitals: 


                                   Vital Signs











  Temp Pulse Pulse Resp BP BP Pulse Ox


 


 03/24/23 10:01        94 L


 


 03/24/23 08:00  98.2 F   57 L  18   119/71  97


 


 03/24/23 02:15  98.1 F   71  18   118/67  95


 


 03/24/23 01:36  98.4 F  67   18  130/81   95


 


 03/23/23 21:20  97.8 F  87   14  122/72   98








                                Intake and Output











 03/23/23 03/24/23 03/24/23





 22:59 06:59 14:59


 


Intake Total   0


 


Balance   0


 


Intake:   


 


  Oral   0


 


Other:   


 


  # Voids   1


 


  Weight 63.503 kg 63.503 kg 














No acute distress, oriented 3.  No respiratory distress.  Currently on room 

air.





HEENT examination is grossly unremarkable.  





Neck supple.  Full range of motion.  No adenopathy thyromegaly or neck vein 

distention.





Cardiovascular examination reveals regular rhythm rate.  S1-S2 normal.  No S3 or

S4.  No discernible murmur noted.  Heart rate 57 bpm.





Lungs reveal clear breath sounds.  Breath sounds are equal bilaterally.  No 

adventitious lung sounds including wheezes rhonchi or crackles.





Abdomen soft bowel sounds are heard.  No masses or tenderness.





Extremities are intact.  No cyanosis clubbing or edema.





Skin is without rash or lesion.





Neurologic examination is weakness of the upper extremity greater than the right

lower extremity.





Results





- Laboratory Findings


CBC and BMP: 


                                 03/24/23 08:32





                                 03/24/23 08:32


PT/INR, D-dimer











PT  10.2 sec (9.0-12.0)   03/23/23  22:06    


 


INR  1.0  (<1.2)   03/23/23  22:06    








Abnormal lab findings: 


                                  Abnormal Labs











  03/23/23 03/24/23 03/24/23





  22:06 00:13 08:32


 


Sodium  136 L  


 


Chloride    108 H


 


Glucose  101 H  


 


Ur Specific Gravity   1.038 H 














- Diagnostic Findings


Chest x-ray: image reviewed





Assessment and Plan


Assessment: 





Right-sided weakness, which may relate to metastatic deposit in the left 

cerebral hemisphere, with associated vasogenic edema.





Mass, right chest, rule out lung malignancy.





Chronic tobacco use.





Prior asbestos exposure.


Plan: 





Plan dated 03/24/2023.





I add Decadron, for the lesion in the left cervical hemisphere, with surrounding

vasogenic edema.  In addition, we will order a CAT scan of the chest, abdomen, 

and pelvis.  The chest x-ray, labs, medications are reviewed.  I'm concerned 

about a mass in the right chest, with possible metastatic deposit in the brain. 

The patient does have a history of heavy tobacco use, and apparently also has 

exposure to asbestos.  We will continue to follow the patient closely.  The 

patient relates to me that he has accepted his condition, and may not proceed 

with any treatments or even a biopsy, at this point.  We will discuss that 

further, once a CAT scan has been completed.


Time with Patient: Greater than 30

## 2023-03-25 VITALS — TEMPERATURE: 98.2 F | DIASTOLIC BLOOD PRESSURE: 61 MMHG | HEART RATE: 66 BPM | SYSTOLIC BLOOD PRESSURE: 110 MMHG

## 2023-03-25 VITALS — RESPIRATION RATE: 17 BRPM

## 2023-03-25 LAB
CHOLEST SERPL-MCNC: 193 MG/DL (ref 0–200)
HDLC SERPL-MCNC: 59.1 MG/DL (ref 40–60)
LDLC SERPL CALC-MCNC: 121.3 MG/DL (ref 0–131)
TRIGL SERPL-MCNC: 62.9 MG/DL (ref 0–149)
VLDLC SERPL CALC-MCNC: 12.58 MG/DL (ref 5–40)

## 2023-03-25 RX ADMIN — HEPARIN SODIUM SCH UNIT: 5000 INJECTION INTRAVENOUS; SUBCUTANEOUS at 10:10

## 2023-03-25 RX ADMIN — CEFAZOLIN SCH MLS/HR: 330 INJECTION, POWDER, FOR SOLUTION INTRAMUSCULAR; INTRAVENOUS at 05:35

## 2023-03-25 RX ADMIN — DEXAMETHASONE SODIUM PHOSPHATE SCH MG: 4 INJECTION, SOLUTION INTRAMUSCULAR; INTRAVENOUS at 05:37

## 2023-03-25 RX ADMIN — ASPIRIN 81 MG CHEWABLE TABLET SCH MG: 81 TABLET CHEWABLE at 10:08

## 2023-03-25 NOTE — CT
EXAMINATION TYPE: CT ChestAbdPelvis w con

 

DATE OF EXAM: 3/25/2023

 

INDICATION: Brain Metastasis

 

COMPARISON: None

 

CT DLP: 745.6 mGycm

 

CONTRAST: 

Performed with Oral Contrast and with IV Contrast, patient injected with 100 ml mL of Isovue 300.

 

TECHNIQUE: Axial images at 5 mm thick sections.  Reconstructed images in the coronal plane.  Delayed 
images through the kidneys.  

 

FINDINGS:

 

CT CHEST:

 

Portion of the thyroid visualized is normal.

 

There is a 0.8 cm nodule in the periphery of the right lung. Series 204 image 26.

There is a lobular 5.3 x 4.2 cm mass in the posterior right midlung. Series 201 and image 35.

 

There is a 1.9 cm pretracheal lymph node with central hypodensity. There is a right hilar lymph node 
measuring 1.3 cm is present. Subcarinal lymph node measuring 1.5 cm is present.

 

The ascending aorta diameter at the level of the main pulmonary artery is 3.4 cm.  The main pulmonary
 artery diameter at the bifurcation is 2.4 cm.

 

CT ABDOMEN: Small hiatal hernia is present.

 

Liver: Normal

 

Spleen: Normal

 

Pancreas: Normal

 

Adrenal glands: The adrenal glands are normal.

 

Gallbladder: Normal  

 

Kidneys: No masses are evident. No hydronephrosis is present.   There is a 4.1 cm cyst medial upper p
ole left kidney.  Delayed images were obtained through the kidneys, which remain unremarkable.

 

Aorta: Vascular calcification is within the aorta. 

 

Inferior vena cava: Normal.

 

CT PELVIS: 

Loops of bowel within the abdomen and pelvis are normal.  Large fecal bolus is within the rectum.   T
here are loops of bowel which are incompletely distended or lack oral contrast limiting their evaluat
ion.

 

Appendix: Normal as visualized.

 

Urinary bladder: Normal. 

 

Genitourinary structures: The prostate is prominent

 

Osseous structures: No suspicious lytic or sclerotic lesions.

 

IMPRESSIONS:

1. There is a 5 cm mass posterior right lung.

2. Smaller 0.8 cm nodules in the periphery of the right lung.

3. Enlarged mediastinal lymphadenopathy suspicious for metastatic disease.

## 2023-03-25 NOTE — P.PN
Subjective


Progress Note Date: 23


Principal diagnosis: 





Neurologic symptoms.





Pulmonary consult dated 2023.





72-year-old male, doesn't have a primary care physician.  The patient apparently

sees my partner from time to time.  The patient presented to the emergency room 

with complaints of weakness, and either seizure, or CVA on the right side of the

body.  He apparently had numbness in his right leg and right arm area there was 

concern for possible CVA.  Apparently, he's been having some weakness in his r

ight lower extremity, he has been dragging his right foot.  The chest x-ray was 

done, and showed a possible pneumonia versus mass, and the right lung.  In 

addition, the computed tomography scan of the brain suggested a possible 

metastatic lesion with some vasogenic edema.  The patient will be scheduled for 

computed tomography scan of the chest abdomen and pelvis.  He has been a heavy 

smoker for many years.  He apparently was also exposed to his testis, working 

for [employer].  He apparently has no ALLERGIES, and the only thing he was using at 

home was eyedrops.  CBC is completely normal.  Coagulation studies are normal.  

Sodium 138, potassium 4.2, chlorides 108, CO2 24, BUN 9, and creatinine 0.81.  

Urine was negative.  Drug screen was negative.  Chest x-ray shows extensive 

pulmonary fibrotic changes, small right-sided pleural effusion, pleural 

thickening right posterior chest wall and mass versus infiltrate, right chest.  

Computed tomography scan of the brain shows evidence of large area of h

ypodensity consistent with an old infarct in the left posterior parietal and 

occipital lobe.  There was no hemorrhage.  Angiography CT, showed a small ring 

enhancing lesion at the left posterior parietal convexity, with some surrounding

edema, consistent with metastatic disease.





Progress note dated 2023.





72-year-old male that was seen in consultation yesterday.  He was admitted with 

neurologic complaints, involving the right side of his body.  He was found to 

have a suspicious lesion in the left side of his brain, by computed tomography 

scan, and MRI, with surrounding vasogenic edema.  In addition, he had a mass in 

the right lung.  Putting everything together, and it appears that he has lung 

cancer with spread to the brain.  There is also some enlarged thoracic lymph 

nodes.  The patient recently lost his wife who he was caring for.  He's very 

depressed and tearful.  He does not want any treatment including any biopsy.  

Likely be discharged home.  He states that his family meeting come to the 

hospital to begin with.  No new labs today.  Neurologic complaints are improved,

after we placed him on Decadron.





Objective





- Vital Signs


Vital signs: 


                                   Vital Signs











Temp  97.6 F   23 08:00


 


Pulse  71   23 08:00


 


Resp  17   23 08:00


 


BP  120/65   23 08:00


 


Pulse Ox  96   23 09:31


 


FiO2      








                                 Intake & Output











 23





 18:59 06:59 18:59


 


Intake Total 460  580


 


Balance 460  580


 


Weight  64.3 kg 


 


Intake:   


 


  Intake, IV Titration   100





  Amount   


 


    Sodium Chloride 0.9% 1,   100





    000 ml @ 100 mls/hr IV .   





    Q10H SEVERINO Rx#:[id number]   


 


  Oral 460  480


 


Other:   


 


  Voiding Method   Toilet


 


  # Voids 1 1 1














- Exam





No acute distress, oriented 3.  No respiratory distress.  Currently on room 

air.





HEENT examination is grossly unremarkable.  





Neck supple.  Full range of motion.  No adenopathy thyromegaly or neck vein 

distention.





Cardiovascular examination reveals regular rhythm rate.  S1-S2 normal.  No S3 or

S4.  No discernible murmur noted.  Heart rate 71 bpm.





Lungs reveal clear breath sounds.  Breath sounds are equal bilaterally.  No 

adventitious lung sounds including wheezes rhonchi or crackles.





Abdomen soft bowel sounds are heard.  No masses or tenderness.





Extremities are intact.  No cyanosis clubbing or edema.





Skin is without rash or lesion.





Neurologic examination is weakness of the upper extremity greater than the right

lower extremity.





- Labs


CBC & Chem 7: 


                                 23 08:32





                                 23 08:32





Assessment and Plan


Assessment: 





Right-sided weakness, which may relate to metastatic deposit in the left 

cerebral hemisphere, with associated vasogenic edema.





Mass, right chest, rule out lung malignancy.





Chronic tobacco use.





Prior asbestos exposure.


Plan: 





Plan dated 2023.





I add Decadron, for the lesion in the left cervical hemisphere, with surrounding

vasogenic edema.  In addition, we will order a CAT scan of the chest, abdomen, 

and pelvis.  The chest x-ray, labs, medications are reviewed.  I'm concerned 

about a mass in the right chest, with possible metastatic deposit in the brain. 

The patient does have a history of heavy tobacco use, and apparently also has 

exposure to asbestos.  We will continue to follow the patient closely.  The 

patient relates to me that he has accepted his condition, and may not proceed 

with any treatments or even a biopsy, at this point.  We will discuss that 

further, once a CAT scan has been completed.





Plan dated 2023.





We added Decadron, which is improved his right-sided weakness, and his gait 

disturbance.  The patient refuses biopsy, and any treatment for suspected 

metastatic lung cancer.  The patient recently has been taking care of his wife, 

who recently .  He is very tearful.  The patient has a history of heavy 

tobacco use.  Again, after multiple discussions with the patient, he was nothing

more done at this time.  If he changes his mind, he will need a biopsy, and 

brain irradiation.


Time with Patient: Less than 30

## 2023-03-25 NOTE — P.DS
Providers


Date of admission: 


03/24/23 00:45





Expected date of discharge: 03/25/23


Attending physician: 


Antolin Valencia MD





Consults: 





                                        





03/24/23 00:46


Consult Physician Routine 


   Consulting Provider: Emory Frances


   Consult Reason/Comments: pleural effusions, possible mass


   Do you want consulting provider notified?: Yes, Notify in am


Consult Physician Routine 


   Consulting Provider: Lyssa Collins


   Consult Reason/Comments: stroke, possible mass


   Do you want consulting provider notified?: Yes, Notify in am











Primary care physician: 


[facility] Course: 





Metastatic cancer of unknown origin with metastasis to the brain


Subacute stroke


Seizure prophylaxis


Nicotine dependence








72 year old male who denies any past medical history presented with some right-

sided weakness and numbness.  In the ED, patient was afebrile, 122/72, heart 

rate 87, 98% on room air.  CBC was unremarkable.  Chemistries are unremarkable. 

Liver function tests are unremarkable.  Troponin was less than 0.012.  Coags are

unremarkable.  UA was unremarkable.  Urine tox screen was negative.  EKG showed 

normal sinus rhythm with appropriate axis and no evidence of ischemia.  Chest x-

ray demonstrated extensive pulmonary fibrotic changes as well as a small right 

pleural effusion, pleural thickening in the right posterior chest wall with a 

possible 6 cm masslike area.  Brain CT showed a large area of hypodensity 

consistent with old infarct in the left posterior parietal and occipital lobe.  

CT angiography of the head and neck showed a small ring enhancing lesion of the 

left posterior parietal convexity that is suspicious for metastatic disease with

surrounding edema without mass effect.  Case was discussed the emergency room 

physician and decision was made to admit the patient to the hospital with pu

lmonary and neurology consultation.  Neurology recommended starting Keppra, 

pursuing brain MRI, EEG.  MRI showed intra-axial mass in the left parietal 

occipital region with surrounding vasogenic edema thought to represent primary 

glioma versus metastatic disease.  EEG showed normal patterns during 

wakefulness, drowsiness and stage II sleep.  Patient was treated with Decadron 

for vasogenic edema and started on Keppra for seizure prophylaxis, as well as 

atorvastatin, aspirin for subacute stroke.  Pulmonology recommended CT of the 

chest, abdomen, pelvis to look for nidus of metastatic disease.  This image 

demonstrated findings of a 5 cm mass in the posterior right lung, smaller 0.8 cm

nodules in the periphery of the right lung, as well as a large mediastinal 

lymphadenopathy suspicious for metastatic disease.  Patient relayed to 

pulmonology as well as to primary team multiple times that he was not interested

in pursuing further workup including biopsy, nor was he interested in pursuing 

therapy at this time.  He was subsequently discharged home with new medications 

for Decadron, Keppra, aspirin, statin, and instructed to follow-up with his 

primary care physician in case he changes his mind.  Palliative care and hospice

services were offered as well, which patient declined at this time.





I spent 38 minutes coordinating this discharge on 3/25





Gen: awake, alert


HEENT: normocephalic, atraumatic, good hearing acuity, moist mucous membranes


Resp: good air exchange, breathing comfortably with no accessory muscle use


CVS: good distal perfusion x 4,


GI: soft, NTTP, ND


: no SPT, no CVAT, frias catheter not present


MSK: no pitting edema, no clubbing


Neuro: non-focal, moving all extremities


Psych: cooperative, euthymic mood





Patient Condition at Discharge: Poor





Plan - Discharge Summary


Discharge Rx Participant: No


New Discharge Prescriptions: 


New


   Aspirin 81 mg PO DAILY #30 tab


   dexAMETHasone [Decadron] 4 mg PO TID #90 tablet


   levETIRAcetam [Keppra] 500 mg PO Q12HR #60 tab


   Atorvastatin [Lipitor] 20 mg PO HS #30 tab





Continue


   Latanoprost Ophth [Xalatan 0.005%] 1 drop BOTH EYES HS


Discharge Medication List





Latanoprost Ophth [Xalatan 0.005%] 1 drop BOTH EYES HS 03/24/23 [History]


Aspirin 81 mg PO DAILY #30 tab 03/25/23 [Rx]


Atorvastatin [Lipitor] 20 mg PO HS #30 tab 03/25/23 [Rx]


dexAMETHasone [Decadron] 4 mg PO TID #90 tablet 03/25/23 [Rx]


levETIRAcetam [Keppra] 500 mg PO Q12HR #60 tab 03/25/23 [Rx]








Follow up Appointment(s)/Referral(s): 


Anthony Oseguera MD [Primary Care Provider] - 1-2 days


Discharge Disposition: HOME SELF-CARE

## 2023-03-25 NOTE — CA
Transthoracic Echo Report 

 Name: Lopez Acevedo 

 MRN:    [id number] 

 Age:    72     Gender:     M 

 

 :    1950 

 Exam Date:     2023 11:06 

 Exam Location: [facility] 

 Ht (in):     70     Wt (lb):     140 

 Ordering Physician:        Antolin Valencia MD 

 Attending/Referring Phys:         [id number], Annie 

 Technician         Rinku Thakur RDCS 

 Procedure CPT: 

 Indications:       stroke 

 

 Cardiac Hx: 

 Technical Quality:      Fair 

 Contrast 1:                                Total Dose (mL): 

 Contrast 2:                                Total Dose (mL): 

 

 MEASUREMENTS  (Male / Female) Normal Values 

 2D ECHO 

 LV Diastolic Diameter PLAX        4.0 cm                4.2 - 5.9 / 3.9 - 5.3 cm 

 LV Systolic Diameter PLAX         3.0 cm                 

 LV Fractional Shortening PLAX     23.9 %                 

 IVS Diastolic Thickness           0.8 cm                0.6 - 1.0 / 0.6 - 0.9 cm 

 IVS Systolic Thickness            1.2 cm                 

 LVPW Diastolic Thickness          0.9 cm                0.6 - 1.0 / 0.6 - 0.9 cm 

 LVPW Systolic Thickness           1.0 cm                 

 LV Relative Wall Thickness        0.4                    

 RV Internal Dim ED PLAX           3.2 cm                 

 LVOT Diameter                     2.2 cm                 

 LA Systolic Diameter LX           2.9 cm                3.0 - 4.0 / 2.7 - 3.8 cm 

 LV Diastolic Volume MOD BP        56.3 cm???              67 - 155 / 56 - 104 cm??? 

 LV Systolic Volume MOD BP         28.5 cm???              22 - 58 / 19 - 49 cm??? 

 LV Ejection Fraction MOD BP       49.5 %                >= 55  % 

 LV Stroke Volume MOD BP           27.9 cm???               

 LV Diastolic Volume MOD 4C        69.0 cm???               

 LV Systolic Volume MOD 4C         32.4 cm???               

 LV Ejection Fraction MOD 4C       53.1 %                 

 LV Stroke Volume MOD 4C           36.6 cm???               

 LV Diastolic Length 4C            7.1 cm                 

 LV Systolic Length 4C             6.4 cm                 

 LV Diastolic Volume MOD 2C        43.4 cm???               

 LV Systolic Volume MOD 2C         25.3 cm???               

 LV Ejection Fraction MOD 2C       41.6 %                 

 LV Stroke Volume MOD 2C           18.1 cm???               

 LV Diastolic Length 2C            7.6 cm                 

 LV Systolic Length 2C             6.4 cm                 

 Ascending Aorta Diameter          3.2 cm                 

 

 M-MODE 

 Aortic Root Diameter MM           3.8 cm                 

 LA Systolic Diameter MM           2.2 cm                 

 LA Ao Ratio MM                    0.6                    

 MV E Point Septal Separation      2.1 cm                 

 AV Cusp Separation MM             1.8 cm                 

 

 DOPPLER 

 AV Peak Velocity                  105.1 cm/s             

 AV Peak Gradient                  4.4 mmHg               

 MV Deceleration [place]             435.9 cm/s???            

 Mitral E Point Velocity           73.4 cm/s              

 Mitral A Point Velocity           60.0 cm/s              

 Mitral E to A Ratio               1.2                    

 MV Deceleration Time              168.5 ms               

 MV E' Velocity                    8.1 cm/s               

 Mitral E to MV E' Ratio           9.1                    

 TR Peak Velocity                  87.0 cm/s              

 TR Peak Gradient                  3.0 mmHg               

 PV Peak Velocity                  83.5 cm/s              

 PV Peak Gradient                  2.8 mmHg               

 

 

 FINDINGS 

 Left Ventricle 

 Left ventricular ejection fraction is estimated at 55-60 %. Grade 2 diastolic  

 dysfunction. Normal basal systolic function. 

 

 Right Ventricle 

 Normal right ventricular size and function. 

 

 Right Atrium 

 Normal right atrial size. 

 

 Left Atrium 

 Normal left atrial size. Myxomatous IAS; 

 

 Mitral Valve 

 Mitral valve thickened. Mild mitral annular calcification. Minimal mitral  

 stenosis. Trace mitral regurgitation. 

 

 Aortic Valve 

 Trileaflet aortic valve. Diffuse thickening (sclerosis) of the aortic valve  

 cusps without reduced excursion. 

 

 Tricuspid Valve 

 Mild tricuspid regurgitation. 

 

 Pulmonic Valve 

 Structurally normal pulmonic valve. 

 

 Pericardium 

 Normal pericardium. No pericardial effusion. 

 

 Aorta 

 Mild aortic dilatation at the level of the sinuses of valsalva (root). Mild  

 aortic dilatation at the level of the sinotubular junction. Aorta at upper  

 limits of normal. 

 

 CONCLUSIONS 

 Normal LV systolic function was EF around 55%.  Grade 2 diastolic dysfunction 

 Normal RV systolic function 

 Mildly thickened mitral valve leaflets with mild MR 

 Aortic sclerosis with no stenosis or insufficiency 

 Previewed by:  

 Dr. Darek Benitez MD 

 (Electronically Signed) 

 Final Date:      2023 12:31

## 2023-04-11 ENCOUNTER — HOSPITAL ENCOUNTER (OUTPATIENT)
Dept: HOSPITAL 47 - RADPROMAIN | Age: 73
Discharge: HOME | End: 2023-04-11
Attending: INTERNAL MEDICINE
Payer: MEDICARE

## 2023-04-11 VITALS — DIASTOLIC BLOOD PRESSURE: 74 MMHG | SYSTOLIC BLOOD PRESSURE: 152 MMHG | HEART RATE: 62 BPM

## 2023-04-11 VITALS — RESPIRATION RATE: 16 BRPM

## 2023-04-11 DIAGNOSIS — C34.91: Primary | ICD-10-CM

## 2023-04-11 LAB
INR PPP: 0.9 (ref ?–1.2)
PLATELET # BLD AUTO: 239 K/UL (ref 150–450)
PT BLD: 9.9 SEC (ref 9–12)

## 2023-04-11 PROCEDURE — 77012 CT SCAN FOR NEEDLE BIOPSY: CPT

## 2023-04-11 PROCEDURE — 71045 X-RAY EXAM CHEST 1 VIEW: CPT

## 2023-04-11 PROCEDURE — 36415 COLL VENOUS BLD VENIPUNCTURE: CPT

## 2023-04-11 PROCEDURE — 85049 AUTOMATED PLATELET COUNT: CPT

## 2023-04-11 PROCEDURE — 88305 TISSUE EXAM BY PATHOLOGIST: CPT

## 2023-04-11 PROCEDURE — 85610 PROTHROMBIN TIME: CPT

## 2023-04-11 PROCEDURE — 32408 CORE NDL BX LNG/MED PERQ: CPT

## 2023-04-11 NOTE — CT
EXAMINATION TYPE: CT biopsy lung RT

 

DATE OF EXAM: 4/11/2023

 

COMPARISON: Right lung biopsy

 

HISTORY: Solitary pulmonary nodule

 

CT DLP: 542 mGycm

 

The procedure is discussed with the patient, the risks, complications, benefits and alternatives, wer
e discussed and any questions were answered.  Informed consent was obtained.  The patient is placed p
timbo on the CT table, prepped and draped in the usual sterile fashion.  

 

Utilizing a 18-gauge cord biopsy needle access into the right lower lobe mass was achieved with 2 marcia
ples obtained.  Pathology pending.  All elements of maximal barrier in sterile technique were utilize
d.  The patient remained stable throughout the procedure with no immediate postprocedural complicatio
n.  

 

IMPRESSION:

 

1.   Successful CT guided core biopsy of a right lower lobe lung mass

## 2023-04-11 NOTE — XR
EXAMINATION TYPE: XR chest 1V portable

 

DATE OF EXAM: 4/11/2023 12:29 PM

 

COMPARISON: Chest radiographs from same day

 

TECHNIQUE: XR chest 1V portable Frontal view of the chest.

 

CLINICAL INDICATION:Male, 72 years old with history of right lung biopsy; 

 

FINDINGS: 

Lungs/Pleura: No evidence of focal consolidation or pneumothorax. Blunting of the costophrenic angles
 is present. Right lower lung mass is unchanged.

Pulmonary vascularity: Unremarkable.

Heart/mediastinum: Cardiomediastinal silhouette is unremarkable.

Musculoskeletal: No acute osseous pathology.

 

IMPRESSION: 

1.  No change from prior, Status post right lung biopsy without evidence for pneumothorax. 

2.  Right lower lung mass as seen on prior imaging.

3.  Trace bilateral pleural effusions.

## 2023-04-11 NOTE — XR
EXAMINATION TYPE: XR chest 1V portable

 

DATE OF EXAM: 4/11/2023 10:23 AM

 

COMPARISON: Chest radiographs from 3/23/2023.

 

TECHNIQUE: XR chest 1V portable Frontal view of the chest.

 

CLINICAL INDICATION:Male, 72 years old with history of right lung biopsy; 

 

FINDINGS: 

Lungs/Pleura: No evidence of focal consolidation or pneumothorax. Blunting of the costophrenic angles
 is present. 

Pulmonary vascularity: Unremarkable.

Heart/mediastinum: Cardiomediastinal silhouette is unremarkable.

Musculoskeletal: No acute osseous pathology.

 

IMPRESSION: 

1.  Status post right lung biopsy without evidence for pneumothorax. 

2.  Right lower lung mass as seen on prior imaging.

3.  Trace bilateral pleural effusions.

## 2023-04-21 ENCOUNTER — HOSPITAL ENCOUNTER (OUTPATIENT)
Dept: HOSPITAL 47 - RADPETMAIN | Age: 73
Discharge: HOME | End: 2023-04-21
Attending: INTERNAL MEDICINE
Payer: MEDICARE

## 2023-04-21 DIAGNOSIS — K11.8: ICD-10-CM

## 2023-04-21 DIAGNOSIS — C79.71: ICD-10-CM

## 2023-04-21 DIAGNOSIS — C34.31: Primary | ICD-10-CM

## 2023-04-21 DIAGNOSIS — C79.51: ICD-10-CM

## 2023-04-21 PROCEDURE — 78815 PET IMAGE W/CT SKULL-THIGH: CPT

## 2023-04-23 NOTE — PE
EXAMINATION TYPE: PET CT fusion skull to thigh

 

DATE OF EXAM: 4/21/2023

 

CLINICAL INDICATION:Male, 72 years old with history of C79.51 MALIGNANT NEOPLASM OF LOWER LOBE, RIGHT
 BRONCHUS;

 

TECHNIQUE:  Following the intravenous administration of  9.9 mCi of F-18 FDG, whole body images are p
erformed from the skull base to the midthigh.  Images are reviewed on the computer in the coronal, ax
ial, and sagittal planes.  Reconstructed rotating images are created on independent workstation and r
eviewed on the computer.   A non-contrast CT is performed in conjunction with the PET scan. Glucose l
evel 108 mg/dL

 

COMPARISON: CT 4/11/2023, 3/25/2023, 3/23/2023, PET/CT None, 

 

FINDINGS: 

Mediastinal SUV mean is 1.4. Hepatic parenchyma SUV mean is 1.7. 

 

SKULL BASE AND NECK: 

*  Left parotid gland lesion with increased FDG activity max SUV 7.8 measuring 11 mm in short axis an
d straddles the deep and superficial portion. This measured up to 16 x 18 mm on prior 3/23/2023.

 

 

CHEST, MEDIASTINUM, AND HILAR REGION:

Scattered FDG active lesions.

*  Right low paratracheal max SUV 6.4 measuring 14 mm in short axis.

*  Subcarinal max SUV 2.4 measuring 8 mm in short axis.

*  Right lung mass max SUV 8.6 measuring at least 5.1 x 4.4 cm. There is more posterior peripheral FD
G activity max SUV 4.3 extending within this lesion.

*  Left lower lung posterior FDG activity max SUV 4.1.

*  Left lateral chest wall focus of abnormal FDG activity 2.6.

 

ABDOMEN AND PELVIS: 

Misregistration artifact seen within the abdomen and pelvis.

Right adrenal mass measuring 3.0 x 3.3 cm Max SUV 5.6 right hepatic lobe focus of FDG activity max CABRALES
V 6.2. Suboptimally evaluated on CT imaging due to noncontrast technique. More inferior liver lesion 
max SUV 2.5

 

OSSEOUS STRUCTURES:

Scattered osseous lesions examples include:

*  Right rib one at the sternoclavicular joint max SUV 3.2.

*  Left rib 3 anteriorly max SUV 2.3

*  Scattered throughout the vertebral bodies including T8 SUV 4.4, T9 max SUV 4.2, T10 max SUV 3.8 an
d S1 max SUV 3.3.

*  Left iliac bone max SUV 3.7 right iliac bone max SUV 2.4

*  S3 max SUV 3.0

 

OTHER CT: Atherosclerosis of the arterial vasculature including the coronary arteries. There is emphy
sema changes throughout the lungs with peripheral superimposed chronic interstitial lung disease segura
ges. Scattered colonic diverticula. Degeneration changes throughout the spine. Left renal cyst.

 

IMPRESSION: 

1.  Right lower lung malignancy with metastatic disease to the osseous structures, right adrenal glan
d, probably liver and mediastinal lymph nodes. Superimposed right lower lobe pneumonia is suspected a
s well.

2.  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 Conside
r tissue sampling.

3.  Left lower lung posterior FDG activity most compatible with pneumonia.

## 2023-04-27 ENCOUNTER — HOSPITAL ENCOUNTER (INPATIENT)
Dept: HOSPITAL 47 - EC | Age: 73
LOS: 2 days | Discharge: HOME | DRG: 193 | End: 2023-04-29
Attending: INTERNAL MEDICINE | Admitting: INTERNAL MEDICINE
Payer: MEDICARE

## 2023-04-27 DIAGNOSIS — Z71.6: ICD-10-CM

## 2023-04-27 DIAGNOSIS — C79.31: ICD-10-CM

## 2023-04-27 DIAGNOSIS — Z79.899: ICD-10-CM

## 2023-04-27 DIAGNOSIS — E03.9: ICD-10-CM

## 2023-04-27 DIAGNOSIS — R79.1: ICD-10-CM

## 2023-04-27 DIAGNOSIS — F17.210: ICD-10-CM

## 2023-04-27 DIAGNOSIS — J96.01: ICD-10-CM

## 2023-04-27 DIAGNOSIS — C34.91: ICD-10-CM

## 2023-04-27 DIAGNOSIS — Z20.822: ICD-10-CM

## 2023-04-27 DIAGNOSIS — J18.9: Primary | ICD-10-CM

## 2023-04-27 DIAGNOSIS — Z79.82: ICD-10-CM

## 2023-04-27 DIAGNOSIS — F41.9: ICD-10-CM

## 2023-04-27 DIAGNOSIS — J43.9: ICD-10-CM

## 2023-04-27 DIAGNOSIS — H40.9: ICD-10-CM

## 2023-04-27 DIAGNOSIS — T17.918A: ICD-10-CM

## 2023-04-27 DIAGNOSIS — E78.5: ICD-10-CM

## 2023-04-27 DIAGNOSIS — Z79.890: ICD-10-CM

## 2023-04-27 DIAGNOSIS — E22.2: ICD-10-CM

## 2023-04-27 DIAGNOSIS — E87.8: ICD-10-CM

## 2023-04-27 DIAGNOSIS — C79.71: ICD-10-CM

## 2023-04-27 DIAGNOSIS — I27.21: ICD-10-CM

## 2023-04-27 DIAGNOSIS — Z86.79: ICD-10-CM

## 2023-04-27 DIAGNOSIS — C78.7: ICD-10-CM

## 2023-04-27 DIAGNOSIS — Z92.3: ICD-10-CM

## 2023-04-27 LAB
ALBUMIN SERPL-MCNC: 2.6 G/DL (ref 3.5–5)
ALP SERPL-CCNC: 116 U/L (ref 38–126)
ALT SERPL-CCNC: 100 U/L (ref 4–49)
ANION GAP SERPL CALC-SCNC: 4 MMOL/L
APTT BLD: 21.8 SEC (ref 22–30)
AST SERPL-CCNC: 55 U/L (ref 17–59)
BUN SERPL-SCNC: 20 MG/DL (ref 9–20)
CALCIUM SPEC-MCNC: 7.8 MG/DL (ref 8.4–10.2)
CELLS COUNTED: 200
CHLORIDE SERPL-SCNC: 95 MMOL/L (ref 98–107)
CO2 SERPL-SCNC: 28 MMOL/L (ref 22–30)
EOSINOPHIL # BLD MANUAL: 0.27 K/UL (ref 0–0.7)
ERYTHROCYTE [DISTWIDTH] IN BLOOD BY AUTOMATED COUNT: 4.82 M/UL (ref 4.3–5.9)
ERYTHROCYTE [DISTWIDTH] IN BLOOD: 15 % (ref 11.5–15.5)
GLUCOSE SERPL-MCNC: 118 MG/DL (ref 74–99)
HCT VFR BLD AUTO: 44.9 % (ref 39–53)
HGB BLD-MCNC: 14.9 GM/DL (ref 13–17.5)
INR PPP: 1 (ref ?–1.2)
LYMPHOCYTES # BLD MANUAL: 1.89 K/UL (ref 1–4.8)
MAGNESIUM SPEC-SCNC: 1.8 MG/DL (ref 1.6–2.3)
MCH RBC QN AUTO: 30.9 PG (ref 25–35)
MCHC RBC AUTO-ENTMCNC: 33.1 G/DL (ref 31–37)
MCV RBC AUTO: 93.3 FL (ref 80–100)
METAMYELOCYTES # BLD: 0.18 K/UL
NEUTROPHILS NFR BLD MANUAL: 39 %
NEUTS SEG # BLD MANUAL: 6.6 K/UL (ref 1.3–7.7)
PH UR: 8 [PH] (ref 5–8)
PLATELET # BLD AUTO: 104 K/UL (ref 150–450)
POTASSIUM SERPL-SCNC: 3.9 MMOL/L (ref 3.5–5.1)
PROT SERPL-MCNC: 5 G/DL (ref 6.3–8.2)
PT BLD: 10.5 SEC (ref 9–12)
SODIUM SERPL-SCNC: 127 MMOL/L (ref 137–145)
SP GR UR: 1.02 (ref 1–1.03)
UROBILINOGEN UR QL STRIP: 8 MG/DL (ref ?–2)
WBC # BLD AUTO: 9 K/UL (ref 3.8–10.6)

## 2023-04-27 PROCEDURE — 96365 THER/PROPH/DIAG IV INF INIT: CPT

## 2023-04-27 PROCEDURE — 94640 AIRWAY INHALATION TREATMENT: CPT

## 2023-04-27 PROCEDURE — 80048 BASIC METABOLIC PNL TOTAL CA: CPT

## 2023-04-27 PROCEDURE — 83880 ASSAY OF NATRIURETIC PEPTIDE: CPT

## 2023-04-27 PROCEDURE — 87186 SC STD MICRODIL/AGAR DIL: CPT

## 2023-04-27 PROCEDURE — 85610 PROTHROMBIN TIME: CPT

## 2023-04-27 PROCEDURE — 71045 X-RAY EXAM CHEST 1 VIEW: CPT

## 2023-04-27 PROCEDURE — 93970 EXTREMITY STUDY: CPT

## 2023-04-27 PROCEDURE — 83735 ASSAY OF MAGNESIUM: CPT

## 2023-04-27 PROCEDURE — 99285 EMERGENCY DEPT VISIT HI MDM: CPT

## 2023-04-27 PROCEDURE — 87077 CULTURE AEROBIC IDENTIFY: CPT

## 2023-04-27 PROCEDURE — 84484 ASSAY OF TROPONIN QUANT: CPT

## 2023-04-27 PROCEDURE — 83605 ASSAY OF LACTIC ACID: CPT

## 2023-04-27 PROCEDURE — 71275 CT ANGIOGRAPHY CHEST: CPT

## 2023-04-27 PROCEDURE — 80053 COMPREHEN METABOLIC PANEL: CPT

## 2023-04-27 PROCEDURE — 87205 SMEAR GRAM STAIN: CPT

## 2023-04-27 PROCEDURE — 84145 PROCALCITONIN (PCT): CPT

## 2023-04-27 PROCEDURE — 85730 THROMBOPLASTIN TIME PARTIAL: CPT

## 2023-04-27 PROCEDURE — 96375 TX/PRO/DX INJ NEW DRUG ADDON: CPT

## 2023-04-27 PROCEDURE — 36415 COLL VENOUS BLD VENIPUNCTURE: CPT

## 2023-04-27 PROCEDURE — 93005 ELECTROCARDIOGRAM TRACING: CPT

## 2023-04-27 PROCEDURE — 87070 CULTURE OTHR SPECIMN AEROBIC: CPT

## 2023-04-27 PROCEDURE — 71046 X-RAY EXAM CHEST 2 VIEWS: CPT

## 2023-04-27 PROCEDURE — 87636 SARSCOV2 & INF A&B AMP PRB: CPT

## 2023-04-27 PROCEDURE — 85379 FIBRIN DEGRADATION QUANT: CPT

## 2023-04-27 PROCEDURE — 94760 N-INVAS EAR/PLS OXIMETRY 1: CPT

## 2023-04-27 PROCEDURE — 85025 COMPLETE CBC W/AUTO DIFF WBC: CPT

## 2023-04-27 PROCEDURE — 81003 URINALYSIS AUTO W/O SCOPE: CPT

## 2023-04-27 RX ADMIN — NICOTINE SCH PATCH: 14 PATCH, EXTENDED RELEASE TRANSDERMAL at 17:43

## 2023-04-27 RX ADMIN — LEVOTHYROXINE SODIUM SCH MCG: 50 TABLET ORAL at 10:30

## 2023-04-27 RX ADMIN — DEMECLOCYCLINE SCH MG: 150 TABLET ORAL at 22:11

## 2023-04-27 RX ADMIN — ATORVASTATIN CALCIUM SCH MG: 20 TABLET, FILM COATED ORAL at 22:11

## 2023-04-27 RX ADMIN — AZITHROMYCIN SCH MG: 500 TABLET, FILM COATED ORAL at 13:17

## 2023-04-27 RX ADMIN — LATANOPROST SCH DROPS: 50 SOLUTION OPHTHALMIC at 22:11

## 2023-04-27 NOTE — P.CNPUL
History of Present Illness


Consult date: 04/27/23


Requesting physician: Jessica Ortiz


Reason for consult: dyspnea, abnormal CXR/CT


Chief complaint: Shortness of breath, anxiety


History of present illness: 





This is a very pleasant 72-year-old male patient with a known history of 

hypothyroidism, hyperlipidemia, chronic and ongoing tobacco dependence of over 

50 years, chronic obstructive pulmonary disease maintained on Trelegy and a 

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  He had

a CT-guided right lung mass core biopsy on 04/11/2023 that was positive for 

squamous cell carcinoma, moderately differentiated, with extensive necrosis.  

Recent PET scan 04/21/2023 revealed a right lower lobe malignancy with meta

static disease to the osseous structures, right adrenal gland, probable liver 

and mediastinal lymph nodes.  MRI of the brain had revealed a left parietal-

occipital mass with surrounding vasogenic edema.  Yesterday he had received his 

last radiation treatment to the brain.  He states shortly after that he was 

feeling quite anxious, confused and short of breath.  He was unable to lay flat 

in bed. He presented here to the emergency room oxygen approximately 3:00 this 

morning.  Chest x-ray reveals confluent right perihilar density more prominent 

on the current exam.  Underlying mass identified.  There is diffuse interstitial

infiltrates may be of infectious or inflammatory etiology versus lymphangitic 

carcinomatosis.  CT angiogram revealed no evidence of pulmonary embolism.  There

is advanced emphysema and pulmonary arterial hypertension.  Increased patchy 

bibasilar opacities.  Known large right posterior infrahilar mass.  Smaller 

subpleural left basilar neoplasm.  Right hilar mediastinal adenopathy.  

Metastatic right adrenal mass.  Largely occult osseous metastatic disease.  

Dopplers of the lower extremities were negative for DVT.  White count 9.0.  

Hemoglobin 14.9.  Platelets 104.  D-dimer 27.7.  Sodium 127.  Potassium 3.9.  

Bicarb 28.  BUN 20.  Creatinine 0.57.  Glucose 118.  Lactic acid 1.6.  AST 55.  

.  Troponin 0.015.  ProBNP 125.  Influenza screen negative.  RSV screen 

negative.  COVID-19 screen negative.  Urinalysis clean.  He is seen today in 

consultation in the emergency department.  He is currently sitting up in a 

stretcher.  Awake and alert in no acute distress.  He is maintaining O2 

saturations in the mid 90s on 2 L/m per nasal cannula.  He is afebrile.  H

emodynamically stable.  His sons are at the bedside.





Review of Systems





REVIEW OF SYSTEMS:


CONSTITUTIONAL: Denies any recent significant weight loss or weight gain.


EYES: Denies change in vision.


EARS, NOSE, MOUTH, THROAT: Denies headaches, denies sore throat.


CARDIOVASCULAR: Denies chest pain, palpitations or syncopal episodes.


RESPIRATORY: Positive for shortness of breath, cough, congestion no hemoptysis.


GASTROINTESTINAL: Denies change in appetite, denies abdominal pain


GENITOURINARY: Denies hematuria, denies infections.


MUSKULOSKELETAL: Denies pain, denies swelling.


INTEGUMENTARY: Denies rash, denies eczema.


NEUROLOGICAL: Denies recent memory loss, no recent seizure activity. 


PSYCHIATRIC: Positive for anxiety, depression.


HEMATOLOGIC/LYMPHATIC: Denies anemia, denies enlarged lymph nodes.








Past Medical History


Past Medical History: No Reported History, Respiratory Disorder


Additional Past Medical History / Comment(s): "brain hemmorahge", spasm rt arm 

and leg. investingating lung masses


History of Any Multi-Drug Resistant Organisms: None Reported


Past Surgical History: No Surgical Hx Reported


Past Anesthesia/Blood Transfusion Reactions: No Reported Reaction


Past Psychological History: No Psychological Hx Reported


Smoking Status: Current every day smoker


Past Alcohol Use History: None Reported


Past Drug Use History: None Reported





- Past Family History


  ** Mother


Family Medical History: No Reported History





  ** Father


Family Medical History: No Reported History





Medications and Allergies


                                Home Medications











 Medication  Instructions  Recorded  Confirmed  Type


 


Latanoprost Ophth [Xalatan 0.005%] 1 drop BOTH EYES HS 03/24/23 04/27/23 History


 


Aspirin 81 mg PO DAILY #30 tab 03/25/23 04/27/23 Rx


 


Atorvastatin [Lipitor] 20 mg PO HS #30 tab 03/25/23 04/27/23 Rx


 


levETIRAcetam [Keppra] 500 mg PO Q12HR #60 tab 03/25/23 04/27/23 Rx


 


Levothyroxine Sodium [Synthroid] 50 mcg PO DAILY 04/11/23 04/27/23 History


 


Albuterol Sulfate [Albuterol 1 - 2 puff PO RT-Q6H PRN 04/27/23 04/27/23 History





Sulfate Hfa]    


 


Fluticasone/Umeclidin/Vilanter 1 puff INHALATION RT-DAILY 04/27/23 04/27/23 

History





[Trelegy Ellipta 100-62.5-25]    


 


dexAMETHasone [Decadron] 4 mg PO BID 04/27/23 04/27/23 History








                                    Allergies











Allergy/AdvReac Type Severity Reaction Status Date / Time


 


No Known Allergies Allergy   Verified 04/27/23 07:23














Physical Exam


Vitals: 


                                   Vital Signs











  Temp Pulse Resp BP Pulse Ox


 


 04/27/23 11:20   81  19  100/61  95


 


 04/27/23 09:00  98.1 F  82  16  99/66  96


 


 04/27/23 06:00   85  18  100/66  97


 


 04/27/23 04:25   84  22   94 L


 


 04/27/23 04:08   79   


 


 04/27/23 03:59   73   


 


 04/27/23 02:57    24  


 


 04/27/23 02:47  98.4 F  83  22  110/79  99








                                Intake and Output











 04/26/23 04/27/23 04/27/23





 22:59 06:59 14:59


 


Other:   


 


  Weight  63.503 kg 














GENERAL EXAM: Alert, very pleasant, frail, cachectic 72-year-old male patient, 

on 2 L nasal cannula, fairly comfortable in no apparent distress.


HEAD: Normocephalic.


EYES: Normal reaction of pupils, equal size.


NOSE: Clear with pink turbinates.


THROAT: No erythema or exudates.


NECK: No masses, no JVD.


CHEST: No chest wall deformity.


LUNGS: Equal air entry with bilateral scattered rhonchi.


CVS: S1 and S2 normal with no audible murmur, regular rhythm.


ABDOMEN: No hepatosplenomegaly, normal bowel sounds, no guarding or rigidity.


SPINE: No scoliosis or deformity


SKIN: No rashes


CENTRAL NERVOUS SYSTEM: No focal deficits, tone is normal in all 4 extremities.


EXTREMITIES: There is no peripheral edema.  No clubbing, no cyanosis.  

Peripheral pulses are intact.





Results





- Laboratory Findings


CBC and BMP: 


                                 04/27/23 03:03





                                 04/27/23 03:03


PT/INR, D-dimer











PT  10.5 sec (9.0-12.0)   04/27/23  03:03    


 


INR  1.0  (<1.2)   04/27/23  03:03    


 


D-Dimer  27.73 mg/L FEU (<0.60)  H  04/27/23  03:03    








Abnormal lab findings: 


                                  Abnormal Labs











  04/27/23 04/27/23 04/27/23





  03:03 03:03 03:03


 


Plt Count  104 L D  


 


Metamyelocytes # (Man)  0.18 H  


 


APTT   21.8 L 


 


D-Dimer   


 


Sodium    127 L


 


Chloride    95 L


 


Creatinine    0.57 L


 


Glucose    118 H


 


Calcium    7.8 L


 


ALT    100 H


 


Total Protein    5.0 L


 


Albumin    2.6 L


 


Urine Protein   














  04/27/23 04/27/23





  03:03 05:45


 


Plt Count  


 


Metamyelocytes # (Man)  


 


APTT  


 


D-Dimer  27.73 H 


 


Sodium  


 


Chloride  


 


Creatinine  


 


Glucose  


 


Calcium  


 


ALT  


 


Total Protein  


 


Albumin  


 


Urine Protein   Trace H














- Diagnostic Findings


Chest x-ray: image reviewed


CT scan - chest: image reviewed


U/S of Legs: image reviewed





Assessment and Plan


Assessment: 





Acute hypoxemic respiratory failure secondary to progressive metastatic lung 

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 

pneumonia and COPD exacerbation.  CT angiogram ruled out pulmonary embolism.  

There is advanced emphysema and pulmonary arterial hypertension.  Increased 

patchy basilar opacities.  Large right posterior infrahilar mass.  Right hilar 

mediastinal adenopathy.  Dopplers of the lower extremity negative for DVT.  Pro-

calcitonin pending.





Metastatic squamous cell carcinoma of the lung with known osseous lesions, 

suspected liver lesions, brain lesion with recent radiation and maintained on 

Decadron





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





Chronic obstructive pulmonary disease maintained on Trelegy and albuterol in the

 outpatient setting





Chronic and ongoing tobacco dependence of greater than 50 years





Hyperlipidemia





Hypothyroidism





Plan:





The patient was seen and evaluated


Chest x-ray, CAT scan, venous Dopplers, medications and labs reviewed


Procalcitonin pending


Continue bronchodilators


Increase Decadron to 4 mg 4 times a day


Initiate ceftriaxone and azithromycin


Add demeclocycline


1500 ML fluid restriction


IV fluid to PIVL


Titrate the FiO2 as tolerated


Oncology consulted


We will continue to follow and make further recommendations based on his 

clinical status





I have personally seen and examined the patient, performed the documentation and

 the assessment and plan as written.  Number of minutes spent on the visit: 20.

## 2023-04-27 NOTE — CT
EXAMINATION TYPE: CT chest angio for PE

 

DATE OF EXAM: 4/27/2023

 

COMPARISON: 3/25/2023

 

HISTORY: 72-year-old male shortness of breath, Chest pain, JOHN

 

TECHNIQUE: Contiguous axial scanning of the chest performed with IV Contrast, patient injected with 1
00 ml mL of Isovue 370. Coronal/sagittal MIP reconstructions performed.

 

CT DLP: 256.9 mGycm

Automated exposure control for dose reduction was used.

 

FINDINGS: 

The heart is upper limits of normal in size without pericardial effusion. No reflux of contrast into 
the hepatic veins. LAD and circumflex coronary artery calcifications are present.

 

Ectatic aortic root at 3.7 cm and ascending aorta 3.5 cm.

 

Bovine configuration to the aortic arch as well as variant direct takeoff of the left vertebral arter
y directly from the aortic arch.

 

Large caliber to the main right and the pulmonary arteries measuring up to 20 cm suggesting underlyin
g pulmonary arterial hypertension. There is satisfactory opacification of the pulmonary arterial syst
em. Extensive breathing motion in the lower lungs limiting assessment for pulmonary emboli here no de
finite pulmonary embolus is seen allowing for this limitation.

 

Redemonstrated mediastinal adenopathy measuring up to 2.3 cm. Background advanced emphysematous [place]
e and large posterior right infrahilar partially necrotic mass measuring up to 5.9 cm. This measures 
slightly larger probably due to increase in adjacent pneumonitis. Trace right pleural effusion persis
ts. Patchy bibasilar airspace opacity and groundglass has slightly increased.

 

Masslike subpleural opacity posterior left base measuring 2.5 cm redemonstrated.

 

Metastatic subcutaneous soft tissue deposits anterolateral left mid chest measuring 1.0 cm in the sub
cutaneous fat layer is redemonstrated.

 

Metastatic 4.3 cm nodule of the right adrenal gland redemonstrated.

 

Known osseous metastatic disease seen on patient's PET/CT largely occult on conventional CT. Moderate
 to advanced multilevel disc disease.

 

 

IMPRESSION: 

 

1. MOTION ARTIFACT LIMITING EVALUATION. NO DEFINITE PULMONARY EMBOLUS.

2. COPD WITH ADVANCED EMPHYSEMA AND PULMONARY ARTERIAL HYPERTENSION.

3. INCREASED PATCHY BIBASILAR OPACITIES. CORRELATE FOR ANY ASPIRATION OR DEVELOPING PNEUMONIA.

4. KNOWN LARGE RIGHT POSTERIOR INFRAHILAR MASS, SMALLER SUBPLEURAL LEFT BASILAR NEOPLASM, RIGHT HILAR
 AND MEDIASTINAL ADENOPATHY, METASTATIC RIGHT ADRENAL MASS, AND LARGELY OCCULT OSSEOUS METASTATIC DIS
EASE.

## 2023-04-27 NOTE — P.CONS
History of Present Illness





- Reason for Consult


Consult date: 04/27/23


hx lung cancr


Requesting physician: Gosia Williamson





- Chief Complaint


SOB





- History of Present Illness





Patient is a 72-year-old male with a significant history of a recent diagnosis 

of metastatic squamous cell carcinoma of the lung.  He is a patient of Dr. Leigh.  He also follows with Dr. Wagner of radiation oncology with plans to 

proceed with 3 SRS treatments to the brain lesion.


Patient presented to the emergency room for worsening shortness of breath and 

wheezing over the last couple days.  Also complains of productive cough. Patient

denies fever and chills. Denies chest pain, and dizziness.


CTA chest revealed no definite pulmonary embolus.  COPD noted with advanced 

emphysema and pulmonary arterial hypertension.  Increased patchy bibasilar 

opacities.  Known large right posterior infrahilar mass, smaller subpleural left

basilar neoplasm, right hilar and mediastinal adenopathy, metastatic right 

adrenal mass, and largely occult osseous metastatic disease.  Venous Doppler of 

bilateral lower extremities were negative for DVT. RSV, flu, COVID are negative.

Hemoglobin 14.9, WBC 9.0, platelets 104,000. Patient afebile. Pulmonology 

following.  Patient started on Rocephin, azithromycin, and demeclocycline, 

steroids and breathing treatments.





Oncology history:


CT of the brain without contrast on 3/23/2023 noted a large area of hypodensity 

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  CT 

angio of the head and neck noted a small ring-enhancing lesion at the left 

posterior parietal convexity suspicious for metastatic disease.  There is noted 

to be surrounding edema without mass effect.  There was no concern for ischemic 

infarct.  Brain MRI on 3/24/2023 noted an intra-axial mass in the left parietal 

occipital lobe with surrounding vasogenic edema.  CT chest/abdomen/pelvis on 

3/24/2023 noted 5.3 x 4.2 cm mass in the posterior right midlung along with a 

0.8 cm nodule in the peripheral right lung.  There was 1.9 cm pretracheal 

lymphadenopathy along with lymphadenopathy in the right hilum and subcarinal 

regions. Core needle biopsy of the right lung mass on 4/12/2022 revealed 

moderately differentiated squamous cell carcinoma with extensive necrosis. He 

has established with Dr. Wagner of radiation oncology with plans to proceed with

3 SRS treatments to the brain lesion. 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 

skipping mutation. After completion of radiation, plan to start pt on capmatinib













Review of Systems





10 point ROS is negative except as stated in the HPI





Past Medical History


Past Medical History: No Reported History, Respiratory Disorder


Additional Past Medical History / Comment(s): "brain hemmorahge", spasm rt arm 

and leg. investingating lung masses


History of Any Multi-Drug Resistant Organisms: None Reported


Past Surgical History: No Surgical Hx Reported


Past Anesthesia/Blood Transfusion Reactions: No Reported Reaction


Past Psychological History: No Psychological Hx Reported


Smoking Status: Current every day smoker


Past Alcohol Use History: None Reported


Past Drug Use History: None Reported





- Past Family History


  ** Mother


Family Medical History: No Reported History





  ** Father


Family Medical History: No Reported History





Medications and Allergies


                                Home Medications











 Medication  Instructions  Recorded  Confirmed  Type


 


Latanoprost Ophth [Xalatan 0.005%] 1 drop BOTH EYES HS 03/24/23 04/27/23 History


 


Aspirin 81 mg PO DAILY #30 tab 03/25/23 04/27/23 Rx


 


Atorvastatin [Lipitor] 20 mg PO HS #30 tab 03/25/23 04/27/23 Rx


 


levETIRAcetam [Keppra] 500 mg PO Q12HR #60 tab 03/25/23 04/27/23 Rx


 


Levothyroxine Sodium [Synthroid] 50 mcg PO DAILY 04/11/23 04/27/23 History


 


Albuterol Sulfate [Albuterol 1 - 2 puff PO RT-Q6H PRN 04/27/23 04/27/23 History





Sulfate Hfa]    


 


Fluticasone/Umeclidin/Vilanter 1 puff INHALATION RT-DAILY 04/27/23 04/27/23 

History





[Trelegy Ellipta 100-62.5-25]    


 


dexAMETHasone [Decadron] 4 mg PO BID 04/27/23 04/27/23 History


 


Demeclocycline [Declomycin] 150 mg PO BID #60 tab 04/29/23  Rx


 


Ipratropium-Albuterol Nebulize 3 ml INHALATION Q4HR PRN #120 ml 04/29/23  Rx





[Duoneb 0.5 mg-3 mg/3 ml Soln]    


 


Ipratropium-Albuterol Nebulize 3 ml INHALATION RT-QID PRN #120 04/29/23  Rx





[Duoneb 0.5 mg-3 mg/3 ml Soln] each   


 


Levofloxacin [Levaquin] 500 mg PO DAILY 3 Days #3 tab 04/29/23  Rx








                                    Allergies











Allergy/AdvReac Type Severity Reaction Status Date / Time


 


No Known Allergies Allergy   Verified 04/30/23 19:51














Physical Exam


Vitals: 


                                   Vital Signs











  Temp Pulse Resp BP Pulse Ox


 


 04/27/23 15:02  98.1 F  95  18  100/68  96


 


 04/27/23 14:09   84  17  108/67  95


 


 04/27/23 13:13   90  17  104/63  97


 


 04/27/23 12:05   80  18  104/63  95


 


 04/27/23 11:20   81  19  100/61  95


 


 04/27/23 09:00  98.1 F  82  16  99/66  96


 


 04/27/23 06:00   85  18  100/66  97


 


 04/27/23 04:25   84  22   94 L


 


 04/27/23 04:08   79   


 


 04/27/23 03:59   73   


 


 04/27/23 02:57    24  


 


 04/27/23 02:47  98.4 F  83  22  110/79  99








                                Intake and Output











 04/27/23 04/27/23 04/27/23





 06:59 14:59 22:59


 


Other:   


 


  Weight 63.503 kg  














- Constitutional


General appearance: average body habitus, no acute distress





- EENT


Eyes: anicteric sclerae, EOMI


ENT: hearing grossly normal





- Neck


Neck: no lymphadenopathy





- Respiratory


Respiratory: bilateral: wheezing





- Cardiovascular


Rhythm: regular


Heart sounds: normal: S1, S2


Abnormal Heart Sounds: no systolic murmur, no diastolic murmur, no rub, no S3 

Gallop, no S4 Gallop, no click, no other


  ** leg


Peripheral Edema: bilateral: None





- Gastrointestinal


General gastrointestinal: soft, no tenderness





- Integumentary


Integumentary: normal





- Neurologic





grossly intact 





- Musculoskeletal


Musculoskeletal: strength equal bilaterally





- Psychiatric


Psychiatric: A&O x's 3, appropriate affect, intact judgment & insight





Results


CBC & Chem 7: 


                                 04/27/23 03:03





                                 04/29/23 05:44


Labs: 


                  Abnormal Lab Results - Last 24 Hours (Table)











  04/27/23 04/27/23 04/27/23 Range/Units





  03:03 03:03 03:03 


 


Plt Count  104 L D    (150-450)  k/uL


 


Metamyelocytes # (Man)  0.18 H    (0)  k/uL


 


APTT   21.8 L   (22.0-30.0)  sec


 


D-Dimer     (<0.60)  mg/L FEU


 


Sodium    127 L  (137-145)  mmol/L


 


Chloride    95 L  ()  mmol/L


 


Creatinine    0.57 L  (0.66-1.25)  mg/dL


 


Glucose    118 H  (74-99)  mg/dL


 


Calcium    7.8 L  (8.4-10.2)  mg/dL


 


ALT    100 H  (4-49)  U/L


 


Total Protein    5.0 L  (6.3-8.2)  g/dL


 


Albumin    2.6 L  (3.5-5.0)  g/dL


 


Urine Protein     (Negative)  














  04/27/23 04/27/23 Range/Units





  03:03 05:45 


 


Plt Count    (150-450)  k/uL


 


Metamyelocytes # (Man)    (0)  k/uL


 


APTT    (22.0-30.0)  sec


 


D-Dimer  27.73 H   (<0.60)  mg/L FEU


 


Sodium    (137-145)  mmol/L


 


Chloride    ()  mmol/L


 


Creatinine    (0.66-1.25)  mg/dL


 


Glucose    (74-99)  mg/dL


 


Calcium    (8.4-10.2)  mg/dL


 


ALT    (4-49)  U/L


 


Total Protein    (6.3-8.2)  g/dL


 


Albumin    (3.5-5.0)  g/dL


 


Urine Protein   Trace H  (Negative)  











CT scan - chest: report reviewed


Venous US: report reviewed





Assessment and Plan


(1) COPD (chronic obstructive pulmonary disease)


Status: Acute   Priority: High   Code(s): J44.9 - CHRONIC OBSTRUCTIVE PULMONARY 

DISEASE, UNSPECIFIED   SNOMED Code(s): 21986119


   





(2) Lung cancer


Status: Acute   Priority: High   Code(s): C34.90 - MALIGNANT NEOPLASM OF UNSP 

PART OF UNSP BRONCHUS OR LUNG   SNOMED Code(s): 036383943


   


Plan: 





Metastatic squamous cell carcinoma of the lung:


-Recently diagnosed squamous cell lung carcinoma with mets to brain. 


-Currently undergoing radiation with Dr. Wagner and completed 3 fractions of SRS

 brain lesion.


-Upon completion of radiation, plan to start pt on capmatinib 


-Will schedule f/u with Dr. Fares Alsawah prior to initiating treatment 





COPD:


-SOB likely due to exacerbation of COPD


-Pulmology following.  Patient started on Rocephin, azithromycin, and steroids 

and breathing treatments.


-Defer management to pulmonology and IM team 





Hyponatremia


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


-Fluid restriction and demeclocycline per primary and pulmonary teams


-Continue to monitor daily








Dr attests: I have performed H&P and developed impression and plan of care for 

patient, discussed with dictator. I agree with dictated note, documented as a 

scribe

## 2023-04-27 NOTE — ED
SOB HPI





- General


Source: patient


Mode of arrival: EMS


Limitations: no limitations





- History of Present Illness


MD Complaint: shortness of breath, cough


Onset/Timin


-: days(s)


Consistency: constant


Improves With: nothing


Worsens With: lying flat


Known History Of: other (Lung cancer)


Associated Symptoms: denies other symptoms


Treatments Prior to Arrival: none





- Related Data


Home Oxygen Therapy: No





<Leif Spain - Last Filed: 23 07:25>





- General


Source: RN notes reviewed, old records reviewed





<MarcosebasEmory - Last Filed: 23 09:16>





- General


Chief Complaint: Shortness of Breath


Stated Complaint: JOHN


Time Seen by Provider: 23 02:48





- History of Present Illness


Initial Comments: 





This patient is 72-year-old man recently diagnosed with right lung cancer.  He 

had been admitted in the hospital at the end of March when the diagnosis was 

established.  He has not had any treatment.  The patient notes that over the 

past couple of days he has been feeling more short of breath.  He has been 

having some aching right anterior chest pain.  He has noticed that he is 

starting to have wheezing when he breathes.  He states that he feels very anxio

us and not able to sleep lying flat.  He has not had fever or chills.  He states

that he doesn't really have much in the way of sputum when he coughs.  No leg 

pain or swelling. (Leif Spain)





- Related Data


                                Home Medications











 Medication  Instructions  Recorded  Confirmed


 


Latanoprost Ophth [Xalatan 0.005%] 1 drop BOTH EYES HS 23


 


Levothyroxine Sodium [Synthroid] 50 mcg PO DAILY 23


 


Albuterol Sulfate [Albuterol 1 - 2 puff PO RT-Q6H PRN 23





Sulfate Hfa]   


 


Fluticasone/Umeclidin/Vilanter 1 puff INHALATION RT-DAILY 23





[Trelegy Ellipta 100-62.5-25]   


 


dexAMETHasone [Decadron] 4 mg PO BID 23








                                  Previous Rx's











 Medication  Instructions  Recorded


 


Aspirin 81 mg PO DAILY #30 tab 23


 


Atorvastatin [Lipitor] 20 mg PO HS #30 tab 23


 


levETIRAcetam [Keppra] 500 mg PO Q12HR #60 tab 23











                                    Allergies











Allergy/AdvReac Type Severity Reaction Status Date / Time


 


No Known Allergies Allergy   Verified 23 07:23














Review of Systems


ROS Other: All systems not noted in ROS Statement are negative.


Constitutional: Denies: fever, chills


ENT: Denies: congestion


Respiratory: Reports: as per HPI, cough, dyspnea, wheezes.  Denies: hemoptysis, 

stridor


Cardiovascular: Reports: as per HPI, chest pain.  Denies: palpitations, syncope


Gastrointestinal: Denies: abdominal pain, vomiting, diarrhea


Genitourinary: Denies: dysuria, hematuria


Musculoskeletal: Denies: back pain


Skin: Denies: rash


Neurological: Denies: headache, weakness





<Leif Spain - Last Filed: 23 07:25>


ROS Other: All systems not noted in ROS Statement are negative.





<Emory Holguin - Last Filed: 23 09:16>


ROS Statement: 


Those systems with pertinent positive or pertinent negative responses have been 

documented in the HPI.








Past Medical History


Past Medical History: No Reported History, Respiratory Disorder


Additional Past Medical History / Comment(s): "brain hemmorahge", spasm rt arm 

and leg. investingating lung masses


History of Any Multi-Drug Resistant Organisms: None Reported


Past Surgical History: No Surgical Hx Reported


Past Anesthesia/Blood Transfusion Reactions: No Reported Reaction


Past Psychological History: No Psychological Hx Reported


Smoking Status: Current every day smoker


Past Alcohol Use History: None Reported


Past Drug Use History: None Reported





- Past Family History


  ** Mother


Family Medical History: No Reported History





  ** Father


Family Medical History: No Reported History





<Leif Spain - Last Filed: 23 07:25>





General Exam


General appearance: alert, in no apparent distress


Head exam: Present: atraumatic, normocephalic


Eye exam: Present: normal appearance.  Absent: scleral icterus, conjunctival 

injection


Neck exam: Present: normal inspection, full ROM


Respiratory exam: Present: wheezes.  Absent: respiratory distress, rales, 

rhonchi, stridor


Cardiovascular Exam: Present: regular rate, normal rhythm, normal heart sounds. 

 Absent: systolic murmur, diastolic murmur, rubs, gallop


GI/Abdominal exam: Present: soft.  Absent: distended, tenderness, guarding, 

rebound, rigid, mass


Extremities exam: Present: normal inspection, normal capillary refill.  Absent: 

pedal edema, calf tenderness


Back exam: Present: normal inspection.  Absent: CVA tenderness (R), CVA 

tenderness (L)


Neurological exam: Present: alert


Skin exam: Present: warm, dry, intact, normal color.  Absent: rash





<JuditLeif - Last Filed: 23 07:25>





Course





                                   Vital Signs











  23





  02:47 02:57 03:59


 


Temperature 98.4 F  


 


Pulse Rate 83  73


 


Respiratory 22 24 





Rate   


 


Blood Pressure 110/79  


 


O2 Sat by Pulse 99  





Oximetry   














  23





  04:08 04:25 06:00


 


Temperature   


 


Pulse Rate 79 84 85


 


Respiratory  22 18





Rate   


 


Blood Pressure   100/66


 


O2 Sat by Pulse  94 L 97





Oximetry   














  23





  09:00


 


Temperature 98.1 F


 


Pulse Rate 82


 


Respiratory 16





Rate 


 


Blood Pressure 99/66


 


O2 Sat by Pulse 96





Oximetry 














Medical Decision Making





- Lab Data


Result diagrams: 


                                 23 03:03





                                 23 03:03





- EKG Data


-: EKG Interpreted by Me


EKG shows normal: sinus rhythm (With sinus arrhythmia), axis (Normal), intervals

 (Normal), QRS complexes (Normal), ST-T waves (Normal)


Rate: normal (Rate 78 bpm)





<JuditLeif - Last Filed: 23 07:25>





- Lab Data


Result diagrams: 


                                 23 03:03





                                 23 03:03





<Emory Holguin - Last Filed: 23 09:16>





- Medical Decision Making





Was pt. sent in by a medical professional or institution (, PA, NP, urgent 

care, hospital, or nursing home...) When possible be specific


@  -No


Did you speak to anyone other than the patient for history (EMS, parent, family,

 police, friend...)? What history was obtained from this source 


@  -No


Did you review nursing and triage notes (agree or disagree)?  Why? 


@  -I reviewed and agree with nursing and triage notes


Were old charts reviewed (outside hosp., previous admission, EMS record, old 

EKG, old radiological studies, urgent care reports/EKG's, nursing home records)?

 Report findings 


@  -No old charts were reviewed


Differential Diagnosis (chest pain, altered mental status, abdominal pain women,

 abdominal pain men, vaginal bleeding, weakness, fever, dyspnea, syncope, 

headache, dizziness, GI bleed, back pain, seizure, CVA, palpatations, mental 

health, musculoskeletal)? 


@  -Differential Dyspnea:


Coronary syndrome, arrhythmia, tamponade, asthma, COPD, pulmonary embolism, 

pneumonia, pneumothorax, pulmonary effusion, anaphylaxis, diabetic ketoacidosis,

 flailed chest, pulmonary contusion, diaphragmatic rupture, anemia, 

neuromuscular, this is not meant to be an all-inclusive list. 


EKG interpreted by me (3pts min.).


@  -As above


X-rays interpreted by me (1pt min.).


@  -None done


CT interpreted by me (1pt min.).


@  -None done


U/S interpreted by me (1pt. min.).


@  -None done


What testing was considered but not performed or refused? (CT, X-rays, U/S, 

labs)? Why?


@  -None


What meds were considered but not given or refused? Why?


@  -None


Did you discuss the management of the patient with other professionals 

(professionals i.e. , PA, NP, lab, RT, psych nurse, , , 

teacher, , )? Give summary


@  -Case discussed with [facility]


Was smoking cessation discussed for >3mins.?


@  -No


Was critical care preformed (if so, how long)?


@  -No


Were there social determinants of health that impacted care today? How? 

(Homelessness, low income, unemployed, alcoholism, drug addiction, 

transportation, low edu. Level, literacy, decrease access to med. care, halfway, 

rehab)?


@  -No


Was there de-escalation of care discussed even if they declined (Discuss DNR or 

withdrawal of care, Hospice)? DNR status


@  -No


What co-morbidities impacted this encounter? (DM, HTN, Smoking, COPD, CAD, 

Cancer, CVA, ARF, Chemo, Hep., AIDS, mental health diagnosis, sleep apnea, 

morbid obesity)?


@  -Lung CA


Was patient admitted / discharged? Hospital course, mention meds given and 

route, prescriptions, significant lab abnormalities, going to OR and other 

pertinent info.


@  -Patient admitted with pulmonology and oncology on consult


Undiagnosed new problem with uncertain prognosis?


@  -No


Drug Therapy requiring intensive monitoring for toxicity (Heparin, Nitro, 

Insulin, Cardizem)?


@  -No


Were any procedures done?


@  -No


Diagnosis/symptom?


@  -Metastatic lung CA


Acute, or Chronic, or Acute on Chronic?


@  -Acute


Uncomplicated (without systemic symptoms) or Complicated (systemic symptoms)?


@  -Complicated


Side effects of treatment?


@  -No


Exacerbation, Progression, or Severe Exacerbation?


@  -No


Poses a threat to life or bodily function? How? (Chest pain, USA, MI, pneumonia,

 PE, COPD, DKA, ARF, appy, cholecystitis, CVA, Diverticulitis, Homicidal, 

Suicidal, threat to staff... and all critical care pts)


@  -[Yes (Emory Holguin)





- Lab Data





                                   Lab Results











  23 Range/Units





  03:03 03:03 03:03 


 


WBC  9.0    (3.8-10.6)  k/uL


 


RBC  4.82    (4.30-5.90)  m/uL


 


Hgb  14.9    (13.0-17.5)  gm/dL


 


Hct  44.9    (39.0-53.0)  %


 


MCV  93.3    (80.0-100.0)  fL


 


MCH  30.9    (25.0-35.0)  pg


 


MCHC  33.1    (31.0-37.0)  g/dL


 


RDW  15.0    (11.5-15.5)  %


 


Plt Count  104 L D    (150-450)  k/uL


 


MPV  7.9    


 


Neutrophils % (Manual)  39    %


 


Band Neuts % (Manual)  35    %


 


Lymphocytes % (Manual)  21    %


 


Eosinophils % (Manual)  3    %


 


Metamyelocytes %  2    %


 


Neutrophils # (Manual)  6.60    (1.3-7.7)  k/uL


 


Lymphocytes # (Manual)  1.89    (1.0-4.8)  k/uL


 


Eosinophils # (Manual)  0.27    (0-0.7)  k/uL


 


Metamyelocytes # (Man)  0.18 H    (0)  k/uL


 


Nucleated RBCs  0    (0-0)  /100 WBC


 


Manual Slide Review  Performed    


 


PT   10.5   (9.0-12.0)  sec


 


INR   1.0   (<1.2)  


 


APTT   21.8 L   (22.0-30.0)  sec


 


D-Dimer     (<0.60)  mg/L FEU


 


Sodium    127 L  (137-145)  mmol/L


 


Potassium    3.9  (3.5-5.1)  mmol/L


 


Chloride    95 L  ()  mmol/L


 


Carbon Dioxide    28  (22-30)  mmol/L


 


Anion Gap    4  mmol/L


 


BUN    20  (9-20)  mg/dL


 


Creatinine    0.57 L  (0.66-1.25)  mg/dL


 


Est GFR (CKD-EPI)AfAm    >90  (>60 ml/min/1.73 sqM)  


 


Est GFR (CKD-EPI)NonAf    >90  (>60 ml/min/1.73 sqM)  


 


Glucose    118 H  (74-99)  mg/dL


 


Plasma Lactic Acid Koby     (0.7-2.0)  mmol/L


 


Calcium    7.8 L  (8.4-10.2)  mg/dL


 


Magnesium    1.8  (1.6-2.3)  mg/dL


 


Total Bilirubin    1.3  (0.2-1.3)  mg/dL


 


AST    55  (17-59)  U/L


 


ALT    100 H  (4-49)  U/L


 


Alkaline Phosphatase    116  ()  U/L


 


Troponin I     (0.000-0.034)  ng/mL


 


NT-Pro-B Natriuret Pep     pg/mL


 


Total Protein    5.0 L  (6.3-8.2)  g/dL


 


Albumin    2.6 L  (3.5-5.0)  g/dL


 


Urine Color     


 


Urine Appearance     (Clear)  


 


Urine pH     (5.0-8.0)  


 


Ur Specific Gravity     (1.001-1.035)  


 


Urine Protein     (Negative)  


 


Urine Glucose (UA)     (Negative)  


 


Urine Ketones     (Negative)  


 


Urine Blood     (Negative)  


 


Urine Nitrite     (Negative)  


 


Urine Bilirubin     (Negative)  


 


Urine Urobilinogen     (<2.0)  mg/dL


 


Ur Leukocyte Esterase     (Negative)  


 


Influenza Type A (PCR)     (Not Detectd)  


 


Influenza Type B (PCR)     (Not Detectd)  


 


RSV (PCR)     (Not Detectd)  


 


SARS-CoV-2 (PCR)     (Not Detectd)  














  23 Range/Units





  03:03 03:03 03:03 


 


WBC     (3.8-10.6)  k/uL


 


RBC     (4.30-5.90)  m/uL


 


Hgb     (13.0-17.5)  gm/dL


 


Hct     (39.0-53.0)  %


 


MCV     (80.0-100.0)  fL


 


MCH     (25.0-35.0)  pg


 


MCHC     (31.0-37.0)  g/dL


 


RDW     (11.5-15.5)  %


 


Plt Count     (150-450)  k/uL


 


MPV     


 


Neutrophils % (Manual)     %


 


Band Neuts % (Manual)     %


 


Lymphocytes % (Manual)     %


 


Eosinophils % (Manual)     %


 


Metamyelocytes %     %


 


Neutrophils # (Manual)     (1.3-7.7)  k/uL


 


Lymphocytes # (Manual)     (1.0-4.8)  k/uL


 


Eosinophils # (Manual)     (0-0.7)  k/uL


 


Metamyelocytes # (Man)     (0)  k/uL


 


Nucleated RBCs     (0-0)  /100 WBC


 


Manual Slide Review     


 


PT     (9.0-12.0)  sec


 


INR     (<1.2)  


 


APTT     (22.0-30.0)  sec


 


D-Dimer    27.73 H  (<0.60)  mg/L FEU


 


Sodium     (137-145)  mmol/L


 


Potassium     (3.5-5.1)  mmol/L


 


Chloride     ()  mmol/L


 


Carbon Dioxide     (22-30)  mmol/L


 


Anion Gap     mmol/L


 


BUN     (9-20)  mg/dL


 


Creatinine     (0.66-1.25)  mg/dL


 


Est GFR (CKD-EPI)AfAm     (>60 ml/min/1.73 sqM)  


 


Est GFR (CKD-EPI)NonAf     (>60 ml/min/1.73 sqM)  


 


Glucose     (74-99)  mg/dL


 


Plasma Lactic Acid Koby     (0.7-2.0)  mmol/L


 


Calcium     (8.4-10.2)  mg/dL


 


Magnesium     (1.6-2.3)  mg/dL


 


Total Bilirubin     (0.2-1.3)  mg/dL


 


AST     (17-59)  U/L


 


ALT     (4-49)  U/L


 


Alkaline Phosphatase     ()  U/L


 


Troponin I  0.015    (0.000-0.034)  ng/mL


 


NT-Pro-B Natriuret Pep   125   pg/mL


 


Total Protein     (6.3-8.2)  g/dL


 


Albumin     (3.5-5.0)  g/dL


 


Urine Color     


 


Urine Appearance     (Clear)  


 


Urine pH     (5.0-8.0)  


 


Ur Specific Gravity     (1.001-1.035)  


 


Urine Protein     (Negative)  


 


Urine Glucose (UA)     (Negative)  


 


Urine Ketones     (Negative)  


 


Urine Blood     (Negative)  


 


Urine Nitrite     (Negative)  


 


Urine Bilirubin     (Negative)  


 


Urine Urobilinogen     (<2.0)  mg/dL


 


Ur Leukocyte Esterase     (Negative)  


 


Influenza Type A (PCR)     (Not Detectd)  


 


Influenza Type B (PCR)     (Not Detectd)  


 


RSV (PCR)     (Not Detectd)  


 


SARS-CoV-2 (PCR)     (Not Detectd)  














  23 Range/Units





  03:31 03:31 05:45 


 


WBC     (3.8-10.6)  k/uL


 


RBC     (4.30-5.90)  m/uL


 


Hgb     (13.0-17.5)  gm/dL


 


Hct     (39.0-53.0)  %


 


MCV     (80.0-100.0)  fL


 


MCH     (25.0-35.0)  pg


 


MCHC     (31.0-37.0)  g/dL


 


RDW     (11.5-15.5)  %


 


Plt Count     (150-450)  k/uL


 


MPV     


 


Neutrophils % (Manual)     %


 


Band Neuts % (Manual)     %


 


Lymphocytes % (Manual)     %


 


Eosinophils % (Manual)     %


 


Metamyelocytes %     %


 


Neutrophils # (Manual)     (1.3-7.7)  k/uL


 


Lymphocytes # (Manual)     (1.0-4.8)  k/uL


 


Eosinophils # (Manual)     (0-0.7)  k/uL


 


Metamyelocytes # (Man)     (0)  k/uL


 


Nucleated RBCs     (0-0)  /100 WBC


 


Manual Slide Review     


 


PT     (9.0-12.0)  sec


 


INR     (<1.2)  


 


APTT     (22.0-30.0)  sec


 


D-Dimer     (<0.60)  mg/L FEU


 


Sodium     (137-145)  mmol/L


 


Potassium     (3.5-5.1)  mmol/L


 


Chloride     ()  mmol/L


 


Carbon Dioxide     (22-30)  mmol/L


 


Anion Gap     mmol/L


 


BUN     (9-20)  mg/dL


 


Creatinine     (0.66-1.25)  mg/dL


 


Est GFR (CKD-EPI)AfAm     (>60 ml/min/1.73 sqM)  


 


Est GFR (CKD-EPI)NonAf     (>60 ml/min/1.73 sqM)  


 


Glucose     (74-99)  mg/dL


 


Plasma Lactic Acid Koby  1.6    (0.7-2.0)  mmol/L


 


Calcium     (8.4-10.2)  mg/dL


 


Magnesium     (1.6-2.3)  mg/dL


 


Total Bilirubin     (0.2-1.3)  mg/dL


 


AST     (17-59)  U/L


 


ALT     (4-49)  U/L


 


Alkaline Phosphatase     ()  U/L


 


Troponin I     (0.000-0.034)  ng/mL


 


NT-Pro-B Natriuret Pep     pg/mL


 


Total Protein     (6.3-8.2)  g/dL


 


Albumin     (3.5-5.0)  g/dL


 


Urine Color    Yellow  


 


Urine Appearance    Clear  (Clear)  


 


Urine pH    8.0  (5.0-8.0)  


 


Ur Specific Gravity    1.020  (1.001-1.035)  


 


Urine Protein    Trace H  (Negative)  


 


Urine Glucose (UA)    Negative  (Negative)  


 


Urine Ketones    Negative  (Negative)  


 


Urine Blood    Negative  (Negative)  


 


Urine Nitrite    Negative  (Negative)  


 


Urine Bilirubin    Negative  (Negative)  


 


Urine Urobilinogen    8.0  (<2.0)  mg/dL


 


Ur Leukocyte Esterase    Negative  (Negative)  


 


Influenza Type A (PCR)   Not Detected   (Not Detectd)  


 


Influenza Type B (PCR)   Not Detected   (Not Detectd)  


 


RSV (PCR)   Not Detected   (Not Detectd)  


 


SARS-CoV-2 (PCR)   Not Detected   (Not Detectd)  














Disposition





<Leif Spain - Last Filed: 23 07:25>


Is patient prescribed a controlled substance at d/c from ED?: No


Time of Disposition: 09:16





<Emory Holguin - Last Filed: 23 09:16>


Clinical Impression: 


 Lung cancer





Disposition: ADMITTED AS IP TO THIS HOSP


Condition: Stable


Referrals: 


J Carlos Serrano MD [STAFF PHYSICIAN] - 1-2 days

## 2023-04-27 NOTE — P.HPIM
History of Present Illness


H&P Date: 04/27/23





Patient is a 72-year-old male with PMH of hypothyroidism, glaucoma, tobacco 

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 

resents the ED for shortness of breath.  Patient reports shortness has been 

progressively worse over the past 7 days.  He reports a cough productive of 

sputum.  His symptoms especially worsened after receiving radiation treatment to

the brain on 4/26.  This prompted him to come to the ED. He reports occasional 

choking and coughing when swallowing food and pills.  He reported O2 saturation 

of 88% on room air when being evaluated by EMS.  Patient denies any headache, 

lower extremity edema, nausea or vomiting, fever or chills, chest pain, palp

itations, changes in urination or bowel habits.  No changes in appetite or 

weight.  He denies any dizziness, numbness/weakness/tingling of the extremities.

 He continues to smoke half pack of cigarettes daily.  In the ED, he required 2 

L nasal cannula to maintain O2 saturation greater than 92%.  Vital signs were 

otherwise stable.  CBC showed platelet count 104.  D-dimer elevated at 27.73.  

CMP showed sodium of 127, chloride of 95, creatinine is 0.57, glucose 118, 

calcium is 7.8, ALT of 100 and albumin of 2.6.  .  Troponin 0.015.  EKG 

showed sinus arrhythmia.  Chest x-ray showed right perihilar density and diffuse

interstitial infiltrates.  CTA chest showed no PE, advanced emphysema, bibasilar

opacities, large right posterior infrahilar mass, smaller left basilar neoplasm,

mediastinal adenopathy, metastatic right adrenal mass and osseous metastatic 

disease.  Lactic acid 1.6.  Urinalysis trace protein.  Influenza, COVID-19 and 

RSV negative.  Patient was admitted for further workup and management of 

symptoms.





Pertinent positives and negatives as discussed in HPI, a complete review of 

systems was performed and all other systems are negative.





General: non toxic, no distress, appears at stated age, cachectic


Derm: warm, dry


Head: atraumatic, normocephalic, symmetric


Eyes: EOMI, no lid lag, anicteric sclera


Mouth: no lip lesion, mucus membranes moist


Cardiovascular: S1S2 reg, no murmur


Lungs: Coarse breath sounds bilateral, bilateral rhonchi, no rales , no 

accessory muscle use


Ext: no gross muscle atrophy, no edema, no contractures


Neuro: no focal neuro deficits


Psych: Alert, oriented, appropriate affect 





Acute hypoxic respiratory failure


Community acquired pneumonia


Possible aspiration


Elevated d-dimer


Hyponatremia, hypochloremia


Chronic conditions: Hypothyroidism, glaucoma, tobacco dependence, COPD, squamous

cell carcinoma of the lung with metastatic disease





Based on my assessment of this patient, this patient meets a high complexity 

level of care.





Patient has an acute diagnosis of acute hypoxic respiratory failure secondary to

pneumonia with possible aspiration in the setting of squamous cell carcinoma of 

the lung with metastatic disease that poses a threat to life or bodily function.

 Patient is currently requiring 2 L nasal cannula to maintain O2 saturation 

greater than 92%.  PE has been ruled out.  Patient be started on Rocephin 2 g IV

twice a day, azithromycin 500 mg by mouth daily.  Obtain pro-calcitonin.  Blood 

cultures ordered. Telemetry monitoring ordered. 





Swallow evaluation ordered.





Oncology consulted for squamous cell carcinoma of the lung with metastatic 

disease.





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 Patient started on 

demeclocycline 150 mg by mouth twice a day along with fluid restriction.





Hypothyroidism - Restart Synthroid 100 mcg PO QD.


Glaucoma - Restart Latanoprost eye drops.


Tobacco dependence - Start Nicotine patch 14mg QD.


COPD - DuoNeb as needed for SOB/wheezing. Restart Symbicort inhaler BID and 

Atrovent neb QID.


SCC of the lung with metastatic disease - Keppra 500 mg PO BID for seizure 

prophylaxis (brain mets). Continue Hexadrol 4 mg PO QID.





Lovenox SQ for DVT prophylaxis.


Sons are the decision maker if he can't make decisions for himself.


FULL CODE.





I have reviewed the following consultant notes:


None.





I have reviewed the results of the following tests:


CBC showed platelet count 104.  D-dimer elevated at 27.73.  CMP showed sodium of

127, chloride of 95, creatinine is 0.57, glucose 118, calcium is 7.8, ALT of 100

and albumin of 2.6.  .  Troponin 0.015. CTA chest showed no PE, advanced 

emphysema, bibasilar opacities, large right posterior infrahilar mass, smaller 

left basilar neoplasm, mediastinal adenopathy, metastatic right adrenal mass and

osseous metastatic disease.  Lactic acid 1.6.  Urinalysis trace protein.  

Influenza, COVID-19 and RSV negative. 





I have ordered the following tests:


Procalcitonin. BMP ordered for tomorrow morning.





I have discussed the care of this patient with the following independent 

historian:


None.





I have independently interpreted the following test below:


EKG showed sinus arrhythmia.  Chest x-ray showed right perihilar density and 

diffuse interstitial infiltrates. 





I have discussed the management of this patient with the following physician:


Case was discussed with the ED physician.








Past Medical History


Past Medical History: No Reported History, Respiratory Disorder


Additional Past Medical History / Comment(s): "brain hemmorahge", spasm rt arm 

and leg. investingating lung masses


History of Any Multi-Drug Resistant Organisms: None Reported


Past Surgical History: No Surgical Hx Reported


Past Anesthesia/Blood Transfusion Reactions: No Reported Reaction


Past Psychological History: No Psychological Hx Reported


Smoking Status: Current every day smoker


Past Alcohol Use History: None Reported


Past Drug Use History: None Reported





- Past Family History


  ** Mother


Family Medical History: No Reported History





  ** Father


Family Medical History: No Reported History





Medications and Allergies


                                Home Medications











 Medication  Instructions  Recorded  Confirmed  Type


 


Latanoprost Ophth [Xalatan 0.005%] 1 drop BOTH EYES HS 03/24/23 04/27/23 History


 


Aspirin 81 mg PO DAILY #30 tab 03/25/23 04/27/23 Rx


 


Atorvastatin [Lipitor] 20 mg PO HS #30 tab 03/25/23 04/27/23 Rx


 


levETIRAcetam [Keppra] 500 mg PO Q12HR #60 tab 03/25/23 04/27/23 Rx


 


Levothyroxine Sodium [Synthroid] 50 mcg PO DAILY 04/11/23 04/27/23 History


 


Albuterol Sulfate [Albuterol 1 - 2 puff PO RT-Q6H PRN 04/27/23 04/27/23 History





Sulfate Hfa]    


 


Fluticasone/Umeclidin/Vilanter 1 puff INHALATION RT-DAILY 04/27/23 04/27/23 

History





[Trelegy Ellipta 100-62.5-25]    


 


dexAMETHasone [Decadron] 4 mg PO BID 04/27/23 04/27/23 History








                                    Allergies











Allergy/AdvReac Type Severity Reaction Status Date / Time


 


No Known Allergies Allergy   Verified 04/27/23 07:23














Physical Exam


Vitals: 


                                   Vital Signs











  Temp Pulse Resp BP Pulse Ox


 


 04/27/23 16:00   98  20  105/70  92 L


 


 04/27/23 15:02  98.1 F  95  18  100/68  96


 


 04/27/23 14:09   84  17  108/67  95


 


 04/27/23 13:13   90  17  104/63  97


 


 04/27/23 12:05   80  18  104/63  95


 


 04/27/23 11:20   81  19  100/61  95


 


 04/27/23 09:00  98.1 F  82  16  99/66  96


 


 04/27/23 06:00   85  18  100/66  97


 


 04/27/23 04:25   84  22   94 L


 


 04/27/23 04:08   79   


 


 04/27/23 03:59   73   


 


 04/27/23 02:57    24  


 


 04/27/23 02:47  98.4 F  83  22  110/79  99








                                Intake and Output











 04/27/23 04/27/23 04/27/23





 06:59 14:59 22:59


 


Other:   


 


  Weight 63.503 kg  














Results


CBC & Chem 7: 


                                 04/27/23 03:03





                                 04/27/23 03:03


Labs: 


                  Abnormal Lab Results - Last 24 Hours (Table)











  04/27/23 04/27/23 04/27/23 Range/Units





  03:03 03:03 03:03 


 


Plt Count  104 L D    (150-450)  k/uL


 


Metamyelocytes # (Man)  0.18 H    (0)  k/uL


 


APTT   21.8 L   (22.0-30.0)  sec


 


D-Dimer     (<0.60)  mg/L FEU


 


Sodium    127 L  (137-145)  mmol/L


 


Chloride    95 L  ()  mmol/L


 


Creatinine    0.57 L  (0.66-1.25)  mg/dL


 


Glucose    118 H  (74-99)  mg/dL


 


Calcium    7.8 L  (8.4-10.2)  mg/dL


 


ALT    100 H  (4-49)  U/L


 


Total Protein    5.0 L  (6.3-8.2)  g/dL


 


Albumin    2.6 L  (3.5-5.0)  g/dL


 


Urine Protein     (Negative)  














  04/27/23 04/27/23 Range/Units





  03:03 05:45 


 


Plt Count    (150-450)  k/uL


 


Metamyelocytes # (Man)    (0)  k/uL


 


APTT    (22.0-30.0)  sec


 


D-Dimer  27.73 H   (<0.60)  mg/L FEU


 


Sodium    (137-145)  mmol/L


 


Chloride    ()  mmol/L


 


Creatinine    (0.66-1.25)  mg/dL


 


Glucose    (74-99)  mg/dL


 


Calcium    (8.4-10.2)  mg/dL


 


ALT    (4-49)  U/L


 


Total Protein    (6.3-8.2)  g/dL


 


Albumin    (3.5-5.0)  g/dL


 


Urine Protein   Trace H  (Negative)

## 2023-04-27 NOTE — US
EXAMINATION TYPE: US venous doppler duplex LE BI

 

DATE OF EXAM: 4/27/2023 10:26 AM

 

COMPARISON: NONE

 

CLINICAL INDICATION: Male, 72 years old with history of Elevated d dimer, h/o cancer; elevated d dime
r

 

SIDE PERFORMED: Bilateral  

 

TECHNIQUE:  The lower extremity deep venous system is examined utilizing real time linear array sonog
jayce with graded compression, doppler sonography and color-flow sonography.

 

VESSELS IMAGED:

Common Femoral Vein

Deep Femoral Vein

Greater Saphenous Vein *

Femoral Vein

Popliteal Vein

Small Saphenous Vein *

Proximal Calf Veins

(* superficial vessels)

 

 

 

Right Leg:  Negative for DVT

 

Left Leg:  Negative for DVT

 

 

 

IMPRESSION: no evidence for DVT at this time.

## 2023-04-27 NOTE — XR
EXAM:

  XR Chest, 2 Views

 

CLINICAL HISTORY:

  ITS.REASON XR Reason: difficulty breathing

 

TECHNIQUE:

  Frontal and lateral views of the chest.

 

COMPARISON:

  4/11/2023

 

FINDINGS:

  Lungs:  Focal right perihilar consolidative changes more prominent on 

the current exam.  Findings are worrisome for underlying mass.  Diffuse 

interstitial infiltrates scattered throughout the lungs.

  Pleural space:  Unremarkable.  No pneumothorax.

  Heart:  Heart size and pulmonary vascularity within normal limits.

  Mediastinum:  Unremarkable.

  Bones/joints:  Unremarkable.

 

IMPRESSION:     

1.  Confluent right perihilar density is more prominent on the current 

exam.  Underlying mass cannot be excluded on the basis of this exam.  

Recommend dedicated chest CT with contrast for further evaluation.

2.  Diffuse interstitial infiltrates are more prominent on the current 

study.  All this may be of infectious or inflammatory etiology, in the 

presence of an underlying pulmonary mass, lymphangitic spread may have a 

similar appearance.  Again this may be further evaluated with dedicated 

chest CT.

## 2023-04-28 LAB
ANION GAP SERPL CALC-SCNC: 6 MMOL/L
BUN SERPL-SCNC: 16 MG/DL (ref 9–20)
CALCIUM SPEC-MCNC: 7.9 MG/DL (ref 8.4–10.2)
CHLORIDE SERPL-SCNC: 98 MMOL/L (ref 98–107)
CO2 SERPL-SCNC: 26 MMOL/L (ref 22–30)
GLUCOSE SERPL-MCNC: 177 MG/DL (ref 74–99)
POTASSIUM SERPL-SCNC: 4 MMOL/L (ref 3.5–5.1)
SODIUM SERPL-SCNC: 130 MMOL/L (ref 137–145)

## 2023-04-28 RX ADMIN — BUDESONIDE AND FORMOTEROL FUMARATE DIHYDRATE SCH PUFF: 80; 4.5 AEROSOL RESPIRATORY (INHALATION) at 07:46

## 2023-04-28 RX ADMIN — DEMECLOCYCLINE SCH MG: 150 TABLET ORAL at 08:32

## 2023-04-28 RX ADMIN — LATANOPROST SCH DROPS: 50 SOLUTION OPHTHALMIC at 21:38

## 2023-04-28 RX ADMIN — IPRATROPIUM BROMIDE SCH MG: 0.5 SOLUTION RESPIRATORY (INHALATION) at 11:03

## 2023-04-28 RX ADMIN — CALCIUM CARBONATE (ANTACID) CHEW TAB 500 MG PRN MG: 500 CHEW TAB at 22:12

## 2023-04-28 RX ADMIN — IPRATROPIUM BROMIDE SCH MG: 0.5 SOLUTION RESPIRATORY (INHALATION) at 07:45

## 2023-04-28 RX ADMIN — ATORVASTATIN CALCIUM SCH MG: 20 TABLET, FILM COATED ORAL at 21:38

## 2023-04-28 RX ADMIN — DEMECLOCYCLINE SCH MG: 150 TABLET ORAL at 21:38

## 2023-04-28 RX ADMIN — ASPIRIN 81 MG CHEWABLE TABLET SCH MG: 81 TABLET CHEWABLE at 08:32

## 2023-04-28 RX ADMIN — NICOTINE SCH PATCH: 14 PATCH, EXTENDED RELEASE TRANSDERMAL at 08:32

## 2023-04-28 RX ADMIN — IPRATROPIUM BROMIDE SCH MG: 0.5 SOLUTION RESPIRATORY (INHALATION) at 15:30

## 2023-04-28 RX ADMIN — LEVOTHYROXINE SODIUM SCH MCG: 50 TABLET ORAL at 06:17

## 2023-04-28 RX ADMIN — AZITHROMYCIN SCH MG: 500 TABLET, FILM COATED ORAL at 08:32

## 2023-04-28 RX ADMIN — IPRATROPIUM BROMIDE SCH MG: 0.5 SOLUTION RESPIRATORY (INHALATION) at 19:20

## 2023-04-28 RX ADMIN — ENOXAPARIN SODIUM SCH MG: 40 INJECTION SUBCUTANEOUS at 08:32

## 2023-04-28 RX ADMIN — BUDESONIDE AND FORMOTEROL FUMARATE DIHYDRATE SCH PUFF: 80; 4.5 AEROSOL RESPIRATORY (INHALATION) at 19:20

## 2023-04-28 NOTE — P.PN
Subjective


Progress Note Date: 04/28/23





Patient is a 72-year-old male with PMH of hypothyroidism, glaucoma, tobacco 

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 

resents the ED for shortness of breath.  Patient reports shortness has been 

progressively worse over the past 7 days.  He reports a cough productive of 

sputum.  His symptoms especially worsened after receiving radiation treatment to

the brain on 4/26.  This prompted him to come to the ED. He reports occasional 

choking and coughing when swallowing food and pills.  He reported O2 saturation 

of 88% on room air when being evaluated by EMS.  Patient denies any headache, 

lower extremity edema, nausea or vomiting, fever or chills, chest pain, 

palpitations, changes in urination or bowel habits.  No changes in appetite or 

weight.  He denies any dizziness, numbness/weakness/tingling of the extremities.

 He continues to smoke half pack of cigarettes daily.  In the ED, he required 2 

L nasal cannula to maintain O2 saturation greater than 92%.  Vital signs were 

otherwise stable.  CBC showed platelet count 104.  D-dimer elevated at 27.73.  

CMP showed sodium of 127, chloride of 95, creatinine is 0.57, glucose 118, 

calcium is 7.8, ALT of 100 and albumin of 2.6.  .  Troponin 0.015.  EKG 

showed sinus arrhythmia.  Chest x-ray showed right perihilar density and diffuse

interstitial infiltrates.  CTA chest showed no PE, advanced emphysema, bibasilar

opacities, large right posterior infrahilar mass, smaller left basilar neoplasm,

mediastinal adenopathy, metastatic right adrenal mass and osseous metastatic 

disease.  Lactic acid 1.6.  Urinalysis trace protein.  Influenza, COVID-19 and 

RSV negative.  Patient was admitted for further workup and management of 

symptoms.





Patient was seen and examined today.  No acute events overnight.  Patient re

ports considerable improvement in his breathing since admission.  States that he

is back to baseline.  He denies any chest pain or palpitations.  No nausea or 

vomiting.  No fever or chills.  





General: non toxic, no distress, appears at stated age, cachectic


Derm: warm, dry


Head: atraumatic, normocephalic, symmetric


Eyes: EOMI, no lid lag, anicteric sclera


Mouth: no lip lesion, mucus membranes moist


Cardiovascular: S1S2 reg, no murmur


Lungs: Coarse breath sounds bilateral, bilateral rhonchi, no rales , no 

accessory muscle use


Ext: no gross muscle atrophy, no edema, no contractures


Neuro: no focal neuro deficits


Psych: Alert, oriented, appropriate affect 





Acute hypoxic respiratory failure


Community acquired pneumonia


Possible aspiration


Elevated d-dimer


Hyponatremia, hypochloremia


Chronic conditions: Hypothyroidism, glaucoma, tobacco dependence, COPD, squamous

cell carcinoma of the lung with metastatic disease





Based on my assessment of this patient, this patient meets moderate complexity 

level of care.





Patient has an acute diagnosis of acute hypoxic respiratory failure secondary to

pneumonia with possible aspiration in the setting of squamous cell carcinoma of 

the lung with metastatic disease that poses a threat to life or bodily function.

 Patient is currently requiring 2 L nasal cannula to maintain O2 saturation 

greater than 92%.  PE has been ruled out.  Patient  will be continued on 

Rocephin 2 g IV twice a day, azithromycin 500 mg by mouth daily.  Pro-calcitonin

0.45.    Blood cultures ordered. Telemetry monitoring ordered. 





Swallow evaluation passed.





Oncology consulted for squamous cell carcinoma of the lung with metastatic 

disease.





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 Patient started on 

demeclocycline 150 mg by mouth twice a day along with fluid restriction. His 

sodium is improving.





Hypothyroidism - Restart Synthroid 100 mcg PO QD.


Glaucoma - Restart Latanoprost eye drops.


Tobacco dependence - Start Nicotine patch 14mg QD.


COPD - DuoNeb as needed for SOB/wheezing. Restart Symbicort inhaler BID and 

Atrovent neb QID.


SCC of the lung with metastatic disease - Keppra 500 mg PO BID for seizure 

prophylaxis (brain mets). Continue Hexadrol 4 mg PO QID.





Lovenox SQ for DVT prophylaxis.


Sons are the decision maker if he can't make decisions for himself.


FULL CODE.





I have reviewed the following consultant notes:


Pulmonology note 4/28, continue present management, anticipate discharge in the 

next 24H.


Oncology note 4/27, plan for 3 SRS treatments to brain lesion, plans for 

capmatinib after that.





I have reviewed the results of the following tests:


BMP shows sodium 130, creatinine of 0.5, glucose of 177 and calcium of 7.9.  





I have ordered the following tests:


None.





I have discussed the care of this patient with the following independent 

historian:


None.





I have independently interpreted the following test below:


None.





I have discussed the management of this patient with the following physician:


None.





Objective





- Vital Signs


Vital signs: 


                                   Vital Signs











Temp  97.7 F   04/28/23 12:22


 


Pulse  76   04/28/23 15:38


 


Resp  16   04/28/23 12:22


 


BP  104/68   04/28/23 12:22


 


Pulse Ox  96   04/28/23 12:22


 


FiO2      








                                 Intake & Output











 04/27/23 04/28/23 04/28/23





 18:59 06:59 18:59


 


Intake Total  830 


 


Output Total  2050 


 


Balance  -1220 


 


Weight  63.503 kg 


 


Intake:   


 


  Oral  830 


 


Output:   


 


  Urine  2050 


 


Other:   


 


  # Voids  2 2


 


  # Bowel Movements   2














- Labs


CBC & Chem 7: 


                                 04/27/23 03:03





                                 04/28/23 06:57


Labs: 


                  Abnormal Lab Results - Last 24 Hours (Table)











  04/27/23 04/28/23 Range/Units





  03:03 06:57 


 


Sodium   130 L  (137-145)  mmol/L


 


Creatinine   0.50 L  (0.66-1.25)  mg/dL


 


Glucose   177 H  (74-99)  mg/dL


 


Calcium   7.9 L  (8.4-10.2)  mg/dL


 


Procalcitonin  0.45 H   (0.02-0.09)  ng/mL

## 2023-04-28 NOTE — P.PN
Subjective


Progress Note Date: 04/28/23





This is a very pleasant 72-year-old male patient with a known history of 

hypothyroidism, hyperlipidemia, chronic and ongoing tobacco dependence of over 

50 years, chronic obstructive pulmonary disease maintained on Trelegy and a 

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  He had

a CT-guided right lung mass core biopsy on 04/11/2023 that was positive for 

squamous cell carcinoma, moderately differentiated, with extensive necrosis.  

Recent PET scan 04/21/2023 revealed a right lower lobe malignancy with 

metastatic disease to the osseous structures, right adrenal gland, probable 

liver and mediastinal lymph nodes.  MRI of the brain had revealed a left 

parietal-occipital mass with surrounding vasogenic edema.  Yesterday he had 

received his last radiation treatment to the brain.  He states shortly after 

that he was feeling quite anxious, confused and short of breath.  He was unable 

to lay flat in bed. He presented here to the emergency room oxygen approximately

3:00 this morning.  Chest x-ray reveals confluent right perihilar density more 

prominent on the current exam.  Underlying mass identified.  There is diffuse 

interstitial infiltrates may be of infectious or inflammatory etiology versus 

lymphangitic carcinomatosis.  CT angiogram revealed no evidence of pulmonary 

embolism.  There is advanced emphysema and pulmonary arterial hypertension.  

Increased patchy bibasilar opacities.  Known large right posterior infrahilar 

mass.  Smaller subpleural left basilar neoplasm.  Right hilar mediastinal 

adenopathy.  Metastatic right adrenal mass.  Largely occult osseous metastatic 

disease.  Dopplers of the lower extremities were negative for DVT.  White count 

9.0.  Hemoglobin 14.9.  Platelets 104.  D-dimer 27.7.  Sodium 127.  Potassium 

3.9.  Bicarb 28.  BUN 20.  Creatinine 0.57.  Glucose 118.  Lactic acid 1.6.  AST

55.  .  Troponin 0.015.  ProBNP 125.  Influenza screen negative.  RSV 

screen negative.  COVID-19 screen negative.  Urinalysis clean.  He is seen today

in consultation in the emergency department.  He is currently sitting up in a 

stretcher.  Awake and alert in no acute distress.  He is maintaining O2 

saturations in the mid 90s on 2 L/m per nasal cannula.  He is afebrile.  

Hemodynamically stable.  His sons are at the bedside.





The patient is seen today 04/28/2023 in follow-up on the regular medical floor. 

He is currently sitting up at the bedside.  Awake and alert in no acute 

distress.  Breathing quite a bit better today compared to yesterday.  He is 

maintaining O2 saturations in the low 90s on 1 L/m per nasal cannula.  Dopplers 

of the lower extremities revealed no evidence of DVT bilaterally.  Sodium 

improved to 130.  Potassium 4.0.  Bicarb 26.  BUN 16.  Creatinine 0.50.  Glucose

177.  Pro-calcitonin 0.45.  He is continued on antibiotics in the form of 

ceftriaxone and azithromycin.  Continue on DuoNeb inhalations, Symbicort.  M

aintained on Decadron 4 mg 4 times a day.  He was initiated on Declomycin 

yesterday.  NicoDerm patch is in place.





Objective





- Vital Signs


Vital signs: 


                                   Vital Signs











Temp  97.8 F   04/28/23 07:30


 


Pulse  80   04/28/23 11:12


 


Resp  18   04/28/23 07:30


 


BP  117/66   04/28/23 07:30


 


Pulse Ox  90 L  04/28/23 07:48


 


FiO2      








                                 Intake & Output











 04/27/23 04/28/23 04/28/23





 18:59 06:59 18:59


 


Intake Total  830 


 


Output Total  2050 


 


Balance  -1220 


 


Weight  63.503 kg 


 


Intake:   


 


  Oral  830 


 


Output:   


 


  Urine  2050 


 


Other:   


 


  # Voids  2 














- Exam





GENERAL EXAM: Alert, oriented, frail, cachectic 72-year-old male patient, on 

oxygen at 1 L nasal cannula, comfortable in no apparent distress.


HEAD: Normocephalic.


EYES: Normal reaction of pupils, equal size.


NOSE: Clear with pink turbinates.


THROAT: No erythema or exudates.


NECK: No masses, no JVD.


CHEST: No chest wall deformity.


LUNGS: Equal air entry with bilateral scattered rhonchi.


CVS: S1 and S2 normal with no audible murmur, regular rhythm.


ABDOMEN: No hepatosplenomegaly, normal bowel sounds, no guarding or rigidity.


SPINE: No scoliosis or deformity


SKIN: No rashes


CENTRAL NERVOUS SYSTEM: No focal deficits, tone is normal in all 4 extremities.


EXTREMITIES: There is no peripheral edema.  No clubbing, no cyanosis.  

Peripheral pulses are intact.





- Labs


CBC & Chem 7: 


                                 04/27/23 03:03





                                 04/28/23 06:57


Labs: 


                  Abnormal Lab Results - Last 24 Hours (Table)











  04/27/23 04/28/23 Range/Units





  03:03 06:57 


 


Sodium   130 L  (137-145)  mmol/L


 


Creatinine   0.50 L  (0.66-1.25)  mg/dL


 


Glucose   177 H  (74-99)  mg/dL


 


Calcium   7.9 L  (8.4-10.2)  mg/dL


 


Procalcitonin  0.45 H   (0.02-0.09)  ng/mL














Assessment and Plan


Assessment: 





Acute hypoxemic respiratory failure secondary to progressive metastatic lung 

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 

pneumonia and COPD exacerbation.  CT angiogram ruled out pulmonary embolism.  

There is advanced emphysema and pulmonary arterial hypertension.  Increased 

patchy basilar opacities.  Large right posterior infrahilar mass.  Right hilar 

mediastinal adenopathy.  Dopplers of the lower extremity negative for DVT.  

Pro-calcitonin 0.45.  Remains on ceftriaxone and azithromycin.





Metastatic squamous cell carcinoma of the lung with known osseous lesions, 

suspected liver lesions, brain lesion with recent radiation and maintained on 

Decadron





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  

Current sodium 130.  Initiated on fluid restrictions and demeclocycline





Chronic obstructive pulmonary disease maintained on Trelegy and albuterol in the

outpatient setting





Chronic and ongoing tobacco dependence of greater than 50 years





Hyperlipidemia





Hypothyroidism





Plan:





The patient was seen and evaluated


Medications and labs reviewed


Continue bronchodilators


Continue Decadron 


Continue ceftriaxone and azithromycin


Procalcitonin 0.45


Continue demeclocycline, 1500 ML fluid restriction


Titrate the FiO2 as tolerated


Follow-up labs and chest x-ray in a.m.


We will continue to follow 





I have personally seen and examined the patient, performed the documentation and

the assessment and plan as written.  Number of minutes spent on the visit: 10.

## 2023-04-29 VITALS — DIASTOLIC BLOOD PRESSURE: 71 MMHG | RESPIRATION RATE: 16 BRPM | SYSTOLIC BLOOD PRESSURE: 128 MMHG

## 2023-04-29 VITALS — HEART RATE: 78 BPM

## 2023-04-29 VITALS — TEMPERATURE: 97.7 F

## 2023-04-29 LAB
ANION GAP SERPL CALC-SCNC: 12 MMOL/L (ref 10–18)
BUN SERPL-SCNC: 19.5 MG/DL (ref 9–27)
BUN/CREAT SERPL: 27.86 RATIO (ref 12–20)
CALCIUM SPEC-MCNC: 8.9 MG/DL (ref 8.7–10.3)
CHLORIDE SERPL-SCNC: 97 MMOL/L (ref 96–109)
CO2 SERPL-SCNC: 29 MMOL/L (ref 20–27.5)
GLUCOSE SERPL-MCNC: 161 MG/DL (ref 70–110)
POTASSIUM SERPL-SCNC: 5.4 MMOL/L (ref 3.5–5.5)
SODIUM SERPL-SCNC: 138 MMOL/L (ref 135–145)

## 2023-04-29 RX ADMIN — DEMECLOCYCLINE SCH MG: 150 TABLET ORAL at 08:25

## 2023-04-29 RX ADMIN — BUDESONIDE AND FORMOTEROL FUMARATE DIHYDRATE SCH PUFF: 80; 4.5 AEROSOL RESPIRATORY (INHALATION) at 08:24

## 2023-04-29 RX ADMIN — IPRATROPIUM BROMIDE SCH MG: 0.5 SOLUTION RESPIRATORY (INHALATION) at 08:24

## 2023-04-29 RX ADMIN — LEVOTHYROXINE SODIUM SCH MCG: 50 TABLET ORAL at 05:59

## 2023-04-29 RX ADMIN — CALCIUM CARBONATE (ANTACID) CHEW TAB 500 MG PRN MG: 500 CHEW TAB at 10:12

## 2023-04-29 RX ADMIN — NICOTINE SCH PATCH: 14 PATCH, EXTENDED RELEASE TRANSDERMAL at 08:21

## 2023-04-29 RX ADMIN — ASPIRIN 81 MG CHEWABLE TABLET SCH MG: 81 TABLET CHEWABLE at 08:23

## 2023-04-29 RX ADMIN — NICOTINE SCH PATCH: 14 PATCH, EXTENDED RELEASE TRANSDERMAL at 08:22

## 2023-04-29 RX ADMIN — AZITHROMYCIN SCH MG: 500 TABLET, FILM COATED ORAL at 08:21

## 2023-04-29 RX ADMIN — ENOXAPARIN SODIUM SCH MG: 40 INJECTION SUBCUTANEOUS at 08:23

## 2023-04-29 NOTE — P.DS
Providers


Date of admission: 


04/27/23 09:12





Expected date of discharge: 04/29/23


Attending physician: 


Jessica Ortiz DO





Consults: 





                                        





04/27/23 09:05


Consult Physician Routine 


   Consulting Provider: Tha Leigh


   Consult Reason/Comments: Lung cancer with mets


   Do you want consulting provider notified?: Yes





04/27/23 09:12


Consult Physician Routine 


   Consulting Provider: Anthony Oseguera


   Consult Reason/Comments: Lung CA


   Do you want consulting provider notified?: Yes











Primary care physician: 


Stated None





Hospital Course: 





Patient is a 72-year-old male with PMH of hypothyroidism, glaucoma, tobacco 

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 

resents the ED for shortness of breath.  Patient reports shortness has been 

progressively worse over the past 7 days.  He reports a cough productive of 

sputum.  His symptoms especially worsened after receiving radiation treatment to

the brain on 4/26.  This prompted him to come to the ED. He reports occasional 

choking and coughing when swallowing food and pills.  He reported O2 saturation 

of 88% on room air when being evaluated by EMS.  Patient denies any headache, 

lower extremity edema, nausea or vomiting, fever or chills, chest pain, 

palpitations, changes in urination or bowel habits.  No changes in appetite or 

weight.  He denies any dizziness, numbness/weakness/tingling of the extremities.

 He continues to smoke half pack of cigarettes daily.  In the ED, he required 2 

L nasal cannula to maintain O2 saturation greater than 92%.  Vital signs were 

otherwise stable.  CBC showed platelet count 104.  D-dimer elevated at 27.73.  

CMP showed sodium of 127, chloride of 95, creatinine is 0.57, glucose 118, 

calcium is 7.8, ALT of 100 and albumin of 2.6.  .  Troponin 0.015.  EKG 

showed sinus arrhythmia.  Chest x-ray showed right perihilar density and diffuse

interstitial infiltrates.  CTA chest showed no PE, advanced emphysema, bibasilar

opacities, large right posterior infrahilar mass, smaller left basilar neoplasm,

mediastinal adenopathy, metastatic right adrenal mass and osseous metastatic 

disease.  Lactic acid 1.6.  Urinalysis trace protein.  Influenza, COVID-19 and 

RSV negative.  Patient was admitted for further workup and management of 

symptoms.





Patient was started on Rocephin and azithromycin for community-acquired 

pneumonia.  His pro-calcitonin was elevated at 0.45.  He passed his swallow 

evaluation.  Pulmonology followed the patient during his hospitalization.  His 

respiratory status considerably improved and he was able to be weaned off 

oxygen.





Patient seen and examined this morning.  No acute events overnight.  Patient 

reports feeling close to baseline.  He is requesting to be discharged home.  He 

is currently not on any supplemental oxygen.  Will attempt to get him a 

nebulizer.  Prescription for Levaquin 500 milligrams by mouth daily for 3 more 

days to complete a total of 5 days.  He is advised follow-up with his PCP, 

pulmonology and oncology in the outpatient setting.  Patient verbalized 

understanding of the plan.





Latoya studies include chest x-ray, chest CT, venous Doppler.





General: non toxic, no distress, appears at stated age, cachectic


Derm: warm, dry


Head: atraumatic, normocephalic, symmetric


Eyes: EOMI, no lid lag, anicteric sclera


Mouth: no lip lesion, mucus membranes moist


Cardiovascular: S1S2 reg, no murmur


Lungs: Coarse breath sounds bilateral, bilateral rhonchi, no rales , no 

accessory muscle use


Ext: no gross muscle atrophy, no edema, no contractures


Neuro: no focal neuro deficits


Psych: Alert, oriented, appropriate affect 





Discharge diagnosis:





Acute hypoxic respiratory failure


Community acquired pneumonia


Possible aspiration


Elevated d-dimer


Hyponatremia, hypochloremia


Chronic conditions: Hypothyroidism, glaucoma, tobacco dependence, COPD, squamous

cell carcinoma of the lung with metastatic disease





This complex discharge took 35 minutes to complete.


Patient Condition at Discharge: Stable





Plan - Discharge Summary


Discharge Rx Participant: Yes


New Discharge Prescriptions: 


New


   Demeclocycline [Declomycin] 150 mg PO BID #60 tab


   Ipratropium-Albuterol Nebulize [Duoneb 0.5 mg-3 mg/3 ml Soln] 3 ml INHALATION

RT-QID PRN #120 each


     PRN Reason: Shortness Of Breath Or Wheezing


   Levofloxacin [Levaquin] 500 mg PO DAILY 3 Days #3 tab


   Ipratropium-Albuterol Nebulize [Duoneb 0.5 mg-3 mg/3 ml Soln] 3 ml INHALATION

Q4HR PRN #120 ml


     PRN Reason: Shortness Of Breath





Continue


   Latanoprost Ophth [Xalatan 0.005%] 1 drop BOTH EYES HS


   Aspirin 81 mg PO DAILY #30 tab


   Levothyroxine Sodium [Synthroid] 50 mcg PO DAILY


   Albuterol Sulfate [Albuterol Sulfate Hfa] 1 - 2 puff PO RT-Q6H PRN


     PRN Reason: Shortness Of Breath


   Fluticasone/Umeclidin/Vilanter [Trelegy Ellipta 100-62.5-25] 1 puff 

INHALATION RT-DAILY


   levETIRAcetam [Keppra] 500 mg PO Q12HR #60 tab


   Atorvastatin [Lipitor] 20 mg PO HS #30 tab


   dexAMETHasone [Decadron] 4 mg PO BID


Discharge Medication List





Latanoprost Ophth [Xalatan 0.005%] 1 drop BOTH EYES HS 03/24/23 [History]


Aspirin 81 mg PO DAILY #30 tab 03/25/23 [Rx]


Atorvastatin [Lipitor] 20 mg PO HS #30 tab 03/25/23 [Rx]


levETIRAcetam [Keppra] 500 mg PO Q12HR #60 tab 03/25/23 [Rx]


Levothyroxine Sodium [Synthroid] 50 mcg PO DAILY 04/11/23 [History]


Albuterol Sulfate [Albuterol Sulfate Hfa] 1 - 2 puff PO RT-Q6H PRN 04/27/23 

[History]


Fluticasone/Umeclidin/Vilanter [Trelegy Ellipta 100-62.5-25] 1 puff INHALATION 

RT-DAILY 04/27/23 [History]


dexAMETHasone [Decadron] 4 mg PO BID 04/27/23 [History]


Demeclocycline [Declomycin] 150 mg PO BID #60 tab 04/29/23 [Rx]


Ipratropium-Albuterol Nebulize [Duoneb 0.5 mg-3 mg/3 ml Soln] 3 ml INHALATION 

Q4HR PRN #120 ml 04/29/23 [Rx]


Ipratropium-Albuterol Nebulize [Duoneb 0.5 mg-3 mg/3 ml Soln] 3 ml INHALATION 

RT-QID PRN #120 each 04/29/23 [Rx]


Levofloxacin [Levaquin] 500 mg PO DAILY 3 Days #3 tab 04/29/23 [Rx]








Follow up Appointment(s)/Referral(s): 


Anthony Oseguera MD [STAFF PHYSICIAN] - 1 Week


J Carlos Serrano MD [STAFF PHYSICIAN] - 1-2 days


Eric Leigh MD [STAFF PHYSICIAN] - 1 Week


Patient Instructions/Handouts:  Lung Cancer (DC), COPD (Chronic Obstructive 

Pulmonary Disease) (DC)


Activity/Diet/Wound Care/Special Instructions: 


Diet as tolerated


Activity as tolerated 


Call on Monday morning to schedule appointments  (office closed on weekends)


 


Discharge Disposition: HOME SELF-CARE

## 2023-04-29 NOTE — XR
EXAMINATION TYPE: XR chest 1V portable

 

DATE OF EXAM: 4/29/2023

 

COMPARISON: 4/11/2023

 

HISTORY: Lung cancer, pneumonia

 

TECHNIQUE: Single frontal view of the chest is obtained.

 

FINDINGS:  

 

There is a large right hilar mass consistent with the provided history of lung cancer. It is essentia
lly unchanged compared to previous.

 

There is a probable small right pleural effusion and atelectasis which is also stable.

 

There is a stable tiny left pleural effusion.

 

There is no pneumothorax.

 

Heart size is normal and the pulmonary vasculature is not congested.

 

There is interstitial opacity lower lobes bilaterally which was seen previously and is stable and eit
her reflects chronic interstitial changes versus an acute process either infectious or neoplastic. Cl
inical correlation is recommended.

 

 

IMPRESSION:  

1. Large right hilar mass consistent with malignancy essentially unchanged compared to previous. 

2. right lower lobe atelectasis and small effusion and tiny left pleural effusion unchanged compared 
to previous. 

3. Interstitial opacity  as described above.

## 2023-04-29 NOTE — P.PN
Subjective


Progress Note Date: 04/29/23





This is a very pleasant 72-year-old male patient with a known history of 

hypothyroidism, hyperlipidemia, chronic and ongoing tobacco dependence of over 

50 years, chronic obstructive pulmonary disease maintained on Trelegy and a 

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  He had

a CT-guided right lung mass core biopsy on 04/11/2023 that was positive for 

squamous cell carcinoma, moderately differentiated, with extensive necrosis.  

Recent PET scan 04/21/2023 revealed a right lower lobe malignancy with 

metastatic disease to the osseous structures, right adrenal gland, probable 

liver and mediastinal lymph nodes.  MRI of the brain had revealed a left 

parietal-occipital mass with surrounding vasogenic edema.  Yesterday he had 

received his last radiation treatment to the brain.  He states shortly after 

that he was feeling quite anxious, confused and short of breath.  He was unable 

to lay flat in bed. He presented here to the emergency room oxygen approximately

3:00 this morning.  Chest x-ray reveals confluent right perihilar density more 

prominent on the current exam.  Underlying mass identified.  There is diffuse 

interstitial infiltrates may be of infectious or inflammatory etiology versus 

lymphangitic carcinomatosis.  CT angiogram revealed no evidence of pulmonary 

embolism.  There is advanced emphysema and pulmonary arterial hypertension.  

Increased patchy bibasilar opacities.  Known large right posterior infrahilar 

mass.  Smaller subpleural left basilar neoplasm.  Right hilar mediastinal 

adenopathy.  Metastatic right adrenal mass.  Largely occult osseous metastatic 

disease.  Dopplers of the lower extremities were negative for DVT.  White count 

9.0.  Hemoglobin 14.9.  Platelets 104.  D-dimer 27.7.  Sodium 127.  Potassium 

3.9.  Bicarb 28.  BUN 20.  Creatinine 0.57.  Glucose 118.  Lactic acid 1.6.  AST

55.  .  Troponin 0.015.  ProBNP 125.  Influenza screen negative.  RSV 

screen negative.  COVID-19 screen negative.  Urinalysis clean.  He is seen today

in consultation in the emergency department.  He is currently sitting up in a 

stretcher.  Awake and alert in no acute distress.  He is maintaining O2 

saturations in the mid 90s on 2 L/m per nasal cannula.  He is afebrile.  

Hemodynamically stable.  His sons are at the bedside.





The patient is seen today 04/28/2023 in follow-up on the regular medical floor. 

He is currently sitting up at the bedside.  Awake and alert in no acute 

distress.  Breathing quite a bit better today compared to yesterday.  He is 

maintaining O2 saturations in the low 90s on 1 L/m per nasal cannula.  Dopplers 

of the lower extremities revealed no evidence of DVT bilaterally.  Sodium 

improved to 130.  Potassium 4.0.  Bicarb 26.  BUN 16.  Creatinine 0.50.  Glucose

177.  Pro-calcitonin 0.45.  He is continued on antibiotics in the form of 

ceftriaxone and azithromycin.  Continue on DuoNeb inhalations, Symbicort.  M

aintained on Decadron 4 mg 4 times a day.  He was initiated on Declomycin 

yesterday.  NicoDerm patch is in place.





The patient is seen today 04/29/2023 in follow-up on the regular medical floor. 

He is currently sitting up at the bedside.  Awake and alert in no acute 

distress.  Feeling nearly back to his baseline as far as his breathing is 

concerned.  He slept well.  He is anxious to go home.  Blood and sputum cultures

revealed no growth.  Sodium 138.  Potassium 5.4.  Bicarb 29.  BUN 20.  Creati

nine 0.7.  Glucose 161.  He is continued on DuoNeb inhalations, Symbicort, 

Decadron.  He is treated with ceftriaxone and azithromycin.  Initiated on 

Declomycin for hyponatremia.  Sodium improved.





Objective





- Vital Signs


Vital signs: 


                                   Vital Signs











Temp  97.7 F   04/29/23 06:01


 


Pulse  78   04/29/23 08:45


 


Resp  16   04/29/23 08:45


 


BP  128/71   04/29/23 06:01


 


Pulse Ox  99   04/29/23 06:01


 


FiO2      








                                 Intake & Output











 04/28/23 04/29/23 04/29/23





 18:59 06:59 18:59


 


Intake Total 898  


 


Balance 898  


 


Intake:   


 


  Blood Product 898  


 


Other:   


 


  # Voids 2 2 


 


  # Bowel Movements 2  














- Exam





GENERAL EXAM: Alert, oriented, frail, cachectic 72-year-old male patient, on 

room air, comfortable in no apparent distress.


HEAD: Normocephalic.


EYES: Normal reaction of pupils, equal size.


NOSE: Clear with pink turbinates.


THROAT: No erythema or exudates.


NECK: No masses, no JVD.


CHEST: No chest wall deformity.


LUNGS: Equal air entry with bilateral scattered rhonchi.


CVS: S1 and S2 normal with no audible murmur, regular rhythm.


ABDOMEN: No hepatosplenomegaly, normal bowel sounds, no guarding or rigidity.


SPINE: No scoliosis or deformity


SKIN: No rashes


CENTRAL NERVOUS SYSTEM: No focal deficits, tone is normal in all 4 extremities.


EXTREMITIES: There is no peripheral edema.  No clubbing, no cyanosis.  

Peripheral pulses are intact.





- Labs


CBC & Chem 7: 


                                 04/27/23 03:03





                                 04/29/23 05:44


Labs: 


                  Abnormal Lab Results - Last 24 Hours (Table)











  04/29/23 Range/Units





  05:44 


 


Carbon Dioxide  29.0 H  (20.0-27.5)  mmol/L


 


BUN/Creatinine Ratio  27.86 H  (12.00-20.00)  Ratio


 


Glucose  161 H  ()  mg/dL








                      Microbiology - Last 24 Hours (Table)











 04/27/23 03:26 Blood Culture - Preliminary





 Blood 


 


 04/27/23 03:11 Blood Culture - Preliminary





 Blood 


 


 04/29/23 01:14 Sputum Culture - Preliminary





 Sputum 














Assessment and Plan


Assessment: 





Acute hypoxemic respiratory failure secondary to progressive metastatic lung 

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 

pneumonia and COPD exacerbation.  CT angiogram ruled out pulmonary embolism.  

There is advanced emphysema and pulmonary arterial hypertension.  Increased 

patchy basilar opacities.  Large right posterior infrahilar mass.  Right hilar 

mediastinal adenopathy.  Dopplers of the lower extremity negative for DVT.  Pro-

calcitonin 0.45.  Remains on ceftriaxone and azithromycin.  Follow-up chest x-

ray reveals right lower lobe atelectasis and small effusion with tiny left 

pleural effusion unchanged. 





Metastatic squamous cell carcinoma of the lung with known osseous lesions, 

suspected liver lesions, brain lesion with recent radiation and maintained on 

Decadron





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  Cu

rrent sodium 138.  Initiated on fluid restrictions and demeclocycline





Chronic obstructive pulmonary disease maintained on Trelegy and albuterol in the

outpatient setting





Chronic and ongoing tobacco dependence of greater than 50 years





Hyperlipidemia





Hypothyroidism





Plan:





The patient was seen and evaluated


Chest x-ray, medications and labs reviewed


Cleared for discharge from the pulmonary standpoint


Discontinue demeclocycline


Complete 5 more days of azithromycin 


Continue Lasix on Monday Wednesday Friday


Follow-up in our office in 1 week


Encouraged to call sooner with any recurrence of symptoms or other questions or 

concerns





I have personally seen and examined the patient, performed the documentation and

the assessment and plan as written.  Number of minutes spent on the visit: 10.

## 2023-05-07 ENCOUNTER — HOSPITAL ENCOUNTER (INPATIENT)
Dept: HOSPITAL 47 - EC | Age: 73
LOS: 1 days | DRG: 951 | End: 2023-05-08
Attending: INTERNAL MEDICINE | Admitting: INTERNAL MEDICINE
Payer: MEDICARE

## 2023-05-07 VITALS — SYSTOLIC BLOOD PRESSURE: 81 MMHG | DIASTOLIC BLOOD PRESSURE: 42 MMHG | RESPIRATION RATE: 26 BRPM

## 2023-05-07 VITALS — HEART RATE: 112 BPM

## 2023-05-07 VITALS — TEMPERATURE: 97.7 F

## 2023-05-07 DIAGNOSIS — Z79.82: ICD-10-CM

## 2023-05-07 DIAGNOSIS — Z79.890: ICD-10-CM

## 2023-05-07 DIAGNOSIS — Z28.21: ICD-10-CM

## 2023-05-07 DIAGNOSIS — C79.71: ICD-10-CM

## 2023-05-07 DIAGNOSIS — J96.90: ICD-10-CM

## 2023-05-07 DIAGNOSIS — Z87.820: ICD-10-CM

## 2023-05-07 DIAGNOSIS — Z51.5: Primary | ICD-10-CM

## 2023-05-07 DIAGNOSIS — I95.9: ICD-10-CM

## 2023-05-07 DIAGNOSIS — D72.829: ICD-10-CM

## 2023-05-07 DIAGNOSIS — F17.210: ICD-10-CM

## 2023-05-07 DIAGNOSIS — C34.01: ICD-10-CM

## 2023-05-07 DIAGNOSIS — N17.9: ICD-10-CM

## 2023-05-07 DIAGNOSIS — Z87.01: ICD-10-CM

## 2023-05-07 DIAGNOSIS — E87.1: ICD-10-CM

## 2023-05-07 DIAGNOSIS — D69.6: ICD-10-CM

## 2023-05-07 DIAGNOSIS — Z28.311: ICD-10-CM

## 2023-05-07 DIAGNOSIS — C78.7: ICD-10-CM

## 2023-05-07 DIAGNOSIS — Z79.899: ICD-10-CM

## 2023-05-07 DIAGNOSIS — Z66: ICD-10-CM

## 2023-05-07 DIAGNOSIS — J44.9: ICD-10-CM

## 2023-05-07 DIAGNOSIS — C79.31: ICD-10-CM

## 2023-05-07 LAB
ALBUMIN SERPL-MCNC: 1.9 G/DL (ref 3.5–5)
ALP SERPL-CCNC: 434 U/L (ref 38–126)
ALT SERPL-CCNC: 640 U/L (ref 4–49)
ANION GAP SERPL CALC-SCNC: 19 MMOL/L
APTT BLD: 23.4 SEC (ref 22–30)
AST SERPL-CCNC: 367 U/L (ref 17–59)
BUN SERPL-SCNC: 86 MG/DL (ref 9–20)
CALCIUM SPEC-MCNC: 8.5 MG/DL (ref 8.4–10.2)
CELLS COUNTED: 200
CHLORIDE SERPL-SCNC: 96 MMOL/L (ref 98–107)
CO2 SERPL-SCNC: 13 MMOL/L (ref 22–30)
ERYTHROCYTE [DISTWIDTH] IN BLOOD BY AUTOMATED COUNT: 3.14 M/UL (ref 4.3–5.9)
ERYTHROCYTE [DISTWIDTH] IN BLOOD: 16.9 % (ref 11.5–15.5)
GLUCOSE SERPL-MCNC: 155 MG/DL (ref 74–99)
HCT VFR BLD AUTO: 30.6 % (ref 39–53)
HGB BLD-MCNC: 9.9 GM/DL (ref 13–17.5)
INR PPP: 1.8 (ref ?–1.2)
LACTATE BLDV-SCNC: 11.5 MMOL/L (ref 0.7–2)
LYMPHOCYTES # BLD MANUAL: 1.75 K/UL (ref 1–4.8)
MAGNESIUM SPEC-SCNC: 2.6 MG/DL (ref 1.6–2.3)
MCH RBC QN AUTO: 31.4 PG (ref 25–35)
MCHC RBC AUTO-ENTMCNC: 32.2 G/DL (ref 31–37)
MCV RBC AUTO: 97.6 FL (ref 80–100)
METAMYELOCYTES # BLD: 1.25 K/UL
MONOCYTES # BLD MANUAL: 0.75 K/UL (ref 0–1)
MYELOCYTES # BLD MANUAL: 0.75 K/UL
NEUTROPHILS NFR BLD MANUAL: 62 %
NEUTS SEG # BLD MANUAL: 20.7 K/UL (ref 1.3–7.7)
PLATELET # BLD AUTO: 30 K/UL (ref 150–450)
POTASSIUM SERPL-SCNC: 5.8 MMOL/L (ref 3.5–5.1)
PROT SERPL-MCNC: 3.7 G/DL (ref 6.3–8.2)
PT BLD: 17.9 SEC (ref 9–12)
SODIUM SERPL-SCNC: 128 MMOL/L (ref 137–145)
WBC # BLD AUTO: 25 K/UL (ref 3.8–10.6)

## 2023-05-07 PROCEDURE — 36556 INSERT NON-TUNNEL CV CATH: CPT

## 2023-05-07 PROCEDURE — 85610 PROTHROMBIN TIME: CPT

## 2023-05-07 PROCEDURE — 96366 THER/PROPH/DIAG IV INF ADDON: CPT

## 2023-05-07 PROCEDURE — 83735 ASSAY OF MAGNESIUM: CPT

## 2023-05-07 PROCEDURE — 96365 THER/PROPH/DIAG IV INF INIT: CPT

## 2023-05-07 PROCEDURE — 02HV33Z INSERTION OF INFUSION DEVICE INTO SUPERIOR VENA CAVA, PERCUTANEOUS APPROACH: ICD-10-PCS

## 2023-05-07 PROCEDURE — 99291 CRITICAL CARE FIRST HOUR: CPT

## 2023-05-07 PROCEDURE — 94640 AIRWAY INHALATION TREATMENT: CPT

## 2023-05-07 PROCEDURE — 85730 THROMBOPLASTIN TIME PARTIAL: CPT

## 2023-05-07 PROCEDURE — 83605 ASSAY OF LACTIC ACID: CPT

## 2023-05-07 PROCEDURE — 84484 ASSAY OF TROPONIN QUANT: CPT

## 2023-05-07 PROCEDURE — 36415 COLL VENOUS BLD VENIPUNCTURE: CPT

## 2023-05-07 PROCEDURE — 80053 COMPREHEN METABOLIC PANEL: CPT

## 2023-05-07 PROCEDURE — 71045 X-RAY EXAM CHEST 1 VIEW: CPT

## 2023-05-07 PROCEDURE — 93005 ELECTROCARDIOGRAM TRACING: CPT

## 2023-05-07 PROCEDURE — 82330 ASSAY OF CALCIUM: CPT

## 2023-05-07 PROCEDURE — 85025 COMPLETE CBC W/AUTO DIFF WBC: CPT

## 2023-05-07 PROCEDURE — 82140 ASSAY OF AMMONIA: CPT

## 2023-05-07 RX ADMIN — PIPERACILLIN AND TAZOBACTAM SCH: 3; .375 INJECTION, POWDER, FOR SOLUTION INTRAVENOUS at 22:21

## 2023-05-07 RX ADMIN — DEXTROSE MONOHYDRATE AND SODIUM CHLORIDE SCH: 5; .9 INJECTION, SOLUTION INTRAVENOUS at 20:44

## 2023-05-07 NOTE — P.HPIM
History of Present Illness


H&P Date: 05/07/23


Chief Complaint: increase SOB





72 year old male with stage 4 squamous  cell  cancer of the lung with mets to 

brain and liver. 





patient had frequent readmissions since diagnosis back in March 2023. he was 

recently discharge to home hospice Yesterday 5/6/2023. but today he was feeling 

increase SOB, and has not established hospice care yet and decided to come in 

for care. after having a discussion in the ED with patient family at bedside. 

family and patient agreed to pursue comfort measures, with hospice consult to 

setup hospice care at home after discharge .





patient was admitted and treated for pneumonia 4/27-4/29 of 2023


then he was readmitted with abd pain 5/1/23-5/6/23 when he was found to have 

liver mets, and discharge to hospice care. CT of the abd showed progression of 

metastatic disease with new innumerable hypodense lesions throughout the liver, 

right adrenal mass, new mesenteric adenopathy posterior right lower lobe 

necrotic appearing mass with hilar adenopathy . during this hospital stay 

(5/1/23-5/6/23) patient had extensive discussions with the caring team, and 

patient made a decision to go with hospice care and be discharged home. 





today he was feeling increasingly weak, and short of breath, he notified his PCP

who happens to be his neighbour and decided to come to the hospital for 

treatment as hospice care has not yet established and patient was not feeling 

well to wait until tomorrow 





Review of Systems











Pertinent positives as noted in HPI. All other systems were reviewed and are 

negative











Past Medical History


Past Medical History: Cancer, COPD, CVA/TIA, Eye Disorder, Respiratory Disorder,

Thyroid Disorder


Additional Past Medical History / Comment(s): "brain hemmorahge", spasm rt arm 

and leg. investingating lung masses, glaucoma, squamous cell lung ca w/ mets


History of Any Multi-Drug Resistant Organisms: None Reported


Past Surgical History: No Surgical Hx Reported


Past Anesthesia/Blood Transfusion Reactions: No Reported Reaction


Past Psychological History: No Psychological Hx Reported


Smoking Status: Current every day smoker


Past Alcohol Use History: None Reported


Past Drug Use History: None Reported





- Past Family History


  ** Mother


Family Medical History: No Reported History





  ** Father


Family Medical History: No Reported History





Medications and Allergies


                                Home Medications











 Medication  Instructions  Recorded  Confirmed  Type


 


Latanoprost Ophth [Xalatan 0.005%] 1 drop BOTH EYES HS 03/24/23 05/07/23 History


 


levETIRAcetam [Keppra] 500 mg PO Q12HR #60 tab 03/25/23 05/07/23 Rx


 


Levothyroxine Sodium [Synthroid] 50 mcg PO DAILY 04/11/23 05/07/23 History


 


Albuterol Sulfate [Albuterol 1 - 2 puff INHALATION RT-Q6H PRN 04/27/23 05/07/23 

History





Sulfate Hfa]    


 


Fluticasone/Umeclidin/Vilanter 1 puff INHALATION RT-DAILY 04/27/23 05/07/23 

History





[Trelegy Ellipta 100-62.5-25]    


 


Demeclocycline [Declomycin] 150 mg PO BID #60 tab 04/29/23 05/07/23 Rx


 


Ipratropium-Albuterol Nebulize 3 ml INHALATION RT-QID PRN #120 04/29/23 05/07/23

 Rx





[Duoneb 0.5 mg-3 mg/3 ml Soln] each   


 


Omeprazole 20 mg PO DAILY PRN 05/01/23 05/07/23 History


 


ALPRAZolam [Xanax] 0.25 mg PO TID PRN  tab 05/06/23 05/07/23 Rx


 


HYDROcodone/APAP 5-325MG [Norco 1 tab PO Q6HR PRN 3 Days #12 tab 05/06/23 05/07/23 Rx





5-325]    


 


Aspirin 81 mg PO DAILY 05/07/23 05/07/23 History








                                    Allergies











Allergy/AdvReac Type Severity Reaction Status Date / Time


 


No Known Allergies Allergy   Verified 05/07/23 17:20














Physical Exam


Vitals: 


                                   Vital Signs











  Temp Pulse Resp BP Pulse Ox


 


 05/07/23 19:30   101 H  28 H  78/64  97


 


 05/07/23 19:03     83/73 


 


 05/07/23 19:00   109 H  24  61/49  98


 


 05/07/23 18:30   102 H  26 H  82/46  95


 


 05/07/23 18:00   109 H  28 H  82/48  96


 


 05/07/23 17:46   106 H  28 H  71/44  96


 


 05/07/23 17:23     73/47 


 


 05/07/23 17:07  97.7 F  120 H  42 H  84/54  86 L








                                Intake and Output











 05/07/23 05/07/23 05/07/23





 06:59 14:59 22:59


 


Intake Total   1.225


 


Balance   1.225


 


Intake:   


 


  Intake, IV Titration   1.225





  Amount   


 


    Norepinephrine 32 mg In   1.225





    Sodium Chloride 0.9% 218   





    ml @ 0.03 MCG/KG/MIN 0.   





    861 mls/hr IV .Q24H ONE   





    Rx#:[id number]   


 


Other:   


 


  Weight   61.235 kg














patient is being seen with his family while still in the trauma bay in the ED. 

he had his head covered with his jacket , and covered with multiple blankets. 

the patient was talking from behind the jacket. he explained this is making him 

feel peaceful and requested not to be examined and disturbed at this time. and 

he made his wishes clear that he wants to pursue hospice care, and does not want

to be disturbed with further tests and treatments , he just want to be 

comfortable 





physical exam deferred 





Results


CBC & Chem 7: 


                                 05/07/23 17:32





                                 05/07/23 17:32


Labs: 


                  Abnormal Lab Results - Last 24 Hours (Table)











  05/07/23 05/07/23 05/07/23 Range/Units





  17:32 17:32 17:32 


 


WBC  25.0 H    (3.8-10.6)  k/uL


 


RBC  3.14 L    (4.30-5.90)  m/uL


 


Hgb  9.9 L    (13.0-17.5)  gm/dL


 


Hct  30.6 L    (39.0-53.0)  %


 


RDW  16.9 H    (11.5-15.5)  %


 


Plt Count  30 L    (150-450)  k/uL


 


Neutrophils # (Manual)  20.70 H    (1.3-7.7)  k/uL


 


Metamyelocytes # (Man)  1.25 H    (0)  k/uL


 


Myelocytes # (Manual)  0.75 H    (0)  k/uL


 


Nucleated RBCs  17 H    (0-0)  /100 WBC


 


PT     (9.0-12.0)  sec


 


INR     (<1.2)  


 


Sodium   128 L   (137-145)  mmol/L


 


Potassium   5.8 H   (3.5-5.1)  mmol/L


 


Chloride   96 L   ()  mmol/L


 


Carbon Dioxide   13 L   (22-30)  mmol/L


 


BUN   86 H   (9-20)  mg/dL


 


Creatinine   2.69 H   (0.66-1.25)  mg/dL


 


Glucose   155 H   (74-99)  mg/dL


 


Plasma Lactic Acid Koby    11.5 H*  (0.7-2.0)  mmol/L


 


Magnesium   2.6 H   (1.6-2.3)  mg/dL


 


Total Bilirubin   8.2 H   (0.2-1.3)  mg/dL


 


AST   367 H   (17-59)  U/L


 


ALT   640 H   (4-49)  U/L


 


Alkaline Phosphatase   434 H   ()  U/L


 


Total Protein   3.7 L   (6.3-8.2)  g/dL


 


Albumin   1.9 L   (3.5-5.0)  g/dL














  05/07/23 Range/Units





  17:32 


 


WBC   (3.8-10.6)  k/uL


 


RBC   (4.30-5.90)  m/uL


 


Hgb   (13.0-17.5)  gm/dL


 


Hct   (39.0-53.0)  %


 


RDW   (11.5-15.5)  %


 


Plt Count   (150-450)  k/uL


 


Neutrophils # (Manual)   (1.3-7.7)  k/uL


 


Metamyelocytes # (Man)   (0)  k/uL


 


Myelocytes # (Manual)   (0)  k/uL


 


Nucleated RBCs   (0-0)  /100 WBC


 


PT  17.9 H  (9.0-12.0)  sec


 


INR  1.8 H  (<1.2)  


 


Sodium   (137-145)  mmol/L


 


Potassium   (3.5-5.1)  mmol/L


 


Chloride   ()  mmol/L


 


Carbon Dioxide   (22-30)  mmol/L


 


BUN   (9-20)  mg/dL


 


Creatinine   (0.66-1.25)  mg/dL


 


Glucose   (74-99)  mg/dL


 


Plasma Lactic Acid Koby   (0.7-2.0)  mmol/L


 


Magnesium   (1.6-2.3)  mg/dL


 


Total Bilirubin   (0.2-1.3)  mg/dL


 


AST   (17-59)  U/L


 


ALT   (4-49)  U/L


 


Alkaline Phosphatase   ()  U/L


 


Total Protein   (6.3-8.2)  g/dL


 


Albumin   (3.5-5.0)  g/dL














Assessment and Plan


Assessment: 





72 year old male with stage 4 squamous cell lung cancer with mets to brain and 

liver. I discussed the case with ED doc, and had in depth  discussion with the 

family and patient and based on that conversation , I accepted the admission for

comfort care measures and hospice evaluation with anticipated length of stay < 2

midnights








metastatic squamous cell lung cancer of the lung to brain and liver 


hypotension 


JO cr 2.9


chronic anemia 9.8


leukocytosis wbc 25


transaminitis AST/ALT in the 600 range 


thrombocytopenia platelet 30  chronic 


elevated bilirubin 8 





poor prognosis 


hospice evaluation pending 


comfort measures, morphine drip , ativan IVP 1 mg PRN for anxiety , supplemental

oxygen as needed, regular diet, artificial eye drops as needed, 





No code

## 2023-05-07 NOTE — XR
EXAMINATION TYPE: XR chest 1V portable

 

DATE OF EXAM: 5/7/2023 6:14 PM

 

COMPARISON: Chest x-ray 4/30/2023, chest x-ray 4/29/2023, CT chest abdomen pelvis 3/25/2023

 

TECHNIQUE: XR chest 1V portable .

 

CLINICAL INDICATION:Male, 72 years old with history of dyspnea; 

 

FINDINGS: 

Lungs/Pleura: Interval enlargement of known right perihilar mass from 4/30/2023. Increasing perilesio
nal atelectasis and hazy appearance of the right lower lobe parenchyma. Increasing hazy infiltration 
of the left lower lobe. No pneumothorax. Blunting of the right costophrenic angle persists suggesting
 at least a small volume pleural effusion. Coarsened interstitial markings bilaterally.

Pulmonary vascularity: Unremarkable.

Heart/mediastinum: Cardiomediastinal silhouette is unremarkable.

Musculoskeletal: No acute osseous pathology.

 

IMPRESSION: 

1. Increasing size of right perihilar mass and associated perilesional consolidative changes. Likely 
of the basis of increasing obstructive atelectasis. Correlate with patient's clinical lab values to a
ssess for superimposed infection.

2. Coarsened interstitial markings bilaterally, intervally increased in the left lower lobe likely on
 the basis of atelectasis.

3. Persistent right pleural effusion.

## 2023-05-07 NOTE — ED
General Adult HPI





- General


Chief complaint: Shortness of Breath


Stated complaint: JOHN


Time Seen by Provider: 05/07/23 17:13


Source: patient, EMS


Mode of arrival: EMS





- History of Present Illness


Initial comments: 


Dictation was produced using dragon dictation software. please excuse any 

grammatical, word or spelling errors. 











Chief Complaint: 72-year-old male presents to the emergency department for 

shortness of breath and weakness





History of Present Illness: Patient is 72-year-old male presents emergency 

department for worsening shortness of breath and weakness.  Patient discharged 

from the hospital yesterday with hospice consultation.  This emergency d

epartment for worsening symptoms.  Apparently was not seen by hospice.  Patient 

is a poor historian.  According to EMS patient had stable vital signs.  Patient 

is allegedly DO NOT RESUSCITATE








Unable to obtain ROS secondary to mental status











- Related Data


                                Home Medications











 Medication  Instructions  Recorded  Confirmed


 


Latanoprost Ophth [Xalatan 0.005%] 1 drop BOTH EYES HS 03/24/23 05/07/23


 


Levothyroxine Sodium [Synthroid] 50 mcg PO DAILY 04/11/23 05/07/23


 


Albuterol Sulfate [Albuterol 1 - 2 puff INHALATION RT-Q6H PRN 04/27/23 05/07/23





Sulfate Hfa]   


 


Fluticasone/Umeclidin/Vilanter 1 puff INHALATION RT-DAILY 04/27/23 05/07/23





[Trelegy Ellipta 100-62.5-25]   


 


Omeprazole 20 mg PO DAILY PRN 05/01/23 05/07/23


 


Aspirin 81 mg PO DAILY 05/07/23 05/07/23








                                  Previous Rx's











 Medication  Instructions  Recorded


 


levETIRAcetam [Keppra] 500 mg PO Q12HR #60 tab 03/25/23


 


Demeclocycline [Declomycin] 150 mg PO BID #60 tab 04/29/23


 


Ipratropium-Albuterol Nebulize 3 ml INHALATION RT-QID PRN #120 04/29/23





[Duoneb 0.5 mg-3 mg/3 ml Soln] each 


 


ALPRAZolam [Xanax] 0.25 mg PO TID PRN  tab 05/06/23


 


HYDROcodone/APAP 5-325MG [Norco 1 tab PO Q6HR PRN 3 Days #12 tab 05/06/23





5-325]  











                                    Allergies











Allergy/AdvReac Type Severity Reaction Status Date / Time


 


No Known Allergies Allergy   Verified 05/07/23 17:20














Review of Systems


ROS Statement: 


Those systems with pertinent positive or pertinent negative responses have been 

documented in the HPI.





ROS Other: All systems not noted in ROS Statement are negative.





Past Medical History


Past Medical History: Cancer, COPD, CVA/TIA, Eye Disorder, Respiratory Disorder,

Thyroid Disorder


Additional Past Medical History / Comment(s): "brain hemmorahge", spasm rt arm 

and leg. investingating lung masses, glaucoma, squamous cell lung ca w/ mets


History of Any Multi-Drug Resistant Organisms: None Reported


Past Surgical History: No Surgical Hx Reported


Past Anesthesia/Blood Transfusion Reactions: No Reported Reaction


Past Psychological History: No Psychological Hx Reported


Smoking Status: Current every day smoker


Past Alcohol Use History: None Reported


Past Drug Use History: None Reported





- Past Family History


  ** Mother


Family Medical History: No Reported History





  ** Father


Family Medical History: No Reported History





General Exam





- General Exam Comments


Initial Comments: 











PHYSICAL EXAM:


General Impression: Alert and oriented x2/4, this make


HEENT: Normocephalic atraumatic, extra-ocular movements intact, pupils equal and

reactive to light bilaterally, dry because membranes


Cardiovascular: Heart regular rate and rhythm


Chest: Tachypneic, dyspneic


Abdomen: abdomen soft, non-tender, non-distended, no organomegaly


Musculoskeletal: Pulses present and equal in all extremities, no peripheral 

edema


Motor:  no focal deficits noted


Neurological: CN II-XII grossly intact, no focal motor or sensory deficits 

noted, confused


Skin: Intact with no visualized rashes














Course


                                   Vital Signs











  05/07/23 05/07/23 05/07/23





  17:07 17:23 17:46


 


Temperature 97.7 F  


 


Pulse Rate 120 H  106 H


 


Respiratory 42 H  28 H





Rate   


 


Blood Pressure 84/54 73/47 71/44


 


O2 Sat by Pulse 86 L  96





Oximetry   














  05/07/23 05/07/23 05/07/23





  18:00 18:30 19:00


 


Temperature   


 


Pulse Rate 109 H 102 H 109 H


 


Respiratory 28 H 26 H 24





Rate   


 


Blood Pressure 82/48 82/46 61/49


 


O2 Sat by Pulse 96 95 98





Oximetry   














  05/07/23 05/07/23





  19:03 19:30


 


Temperature  


 


Pulse Rate  101 H


 


Respiratory  28 H





Rate  


 


Blood Pressure 83/73 78/64


 


O2 Sat by Pulse  97





Oximetry  














- Reevaluation(s)


Reevaluation #1: 





05/07/23 18:16


Chart review was performed.  Patient had just been discharged for similar 

presentation yesterday.  He was in the hospital for approximately 4-5 days.  Was

evaluated by oncology.  Decision was made for patient to become hospice.  Spoke 

with hospice at 6:15 PM.  They did not evaluate the patient due to some 

administrative errors.  Apparently he has worsening cancer metastatic.  Patient 

did report DO NOT RESUSCITATE status he was however agreeable to central venous 

catheter line and IV pressors.





Reevaluation #2: 





05/07/23 18:33


Family is at the bedside provides more history.  The report that patient lives 

with a brother brother was with the patient today when all of a sudden he became

altered and having trouble breathing.  They are friends of one of our 

intensivist.  The intensivist is a neighbor Dr. Oseguera.  He went to go see the 

patient.  EMS ultimately was called which is outpatient up in the emergency 

department.  The report that he was supposed to get a hospice consult at home 

however there was a cancellation due to patient requesting cancer treatment.








Procedures





- Central Line Placement


  ** Right Femoral


Consent Obtained: verbal consent, emergent situation


Patient Placed on Monitor/Pulse Ox: Yes


MD Prep: mask, gown, gloves


Central Line Prep: Chlorhexidine scrub


Local Anesthesia Used: Lidocaine 1%


Ultrasound Used for Placement: Yes


Central Line Lumen Inserted: triple


Bloods Obtained for Lab: Yes


Central Line Position: good blood return, all ports aspirated, flushed, capped, 

sutured in place with 3-0 nylon


Dressing Applied: Tegaderm


Post Procedure X-Ray: tip of catheter in good position


Patient Tolerated Procedure: well


Complications: none





- Sepsis


  ** Sepsis Focused Exam #1


Time Sepsis Criteria Met: 18:36


Sepsis Focused Exam Date: 05/07/23


Sepsis Focused Exam Time: 18:36


Sepsis Focused Exam Complete: Yes


Vital Signs & RN Notes Reviewed: Yes


Capillary Refill: < 2 Seconds: Fingers, Toes


Peripheral Pulses: Weak: Radial (R), Radial (L), Posterior Tibialis (R), 

Posterior Tibialis (L), Dorsalis Pedis (R), Dorsalis Pedis (L)


Skin Color: Pallor


Respiratory Exam: decreased breath sounds


Cardiovascular Exam: tachycardia





Medical Decision Making





- Medical Decision Making


Was pt. sent in by a medical professional or institution (MICHELE Miranda, NP, urgent 

care, hospital, or nursing home...) When possible be specific


@  -No


Did you speak to anyone other than the patient for history (EMS, parent, family,

police, friend...)? What history was obtained from this source 


@  -Family who provide a lot of history.  Please see above for further detail


Did you review nursing and triage notes (agree or disagree)?  Why? 


@  -I reviewed and agree with nursing and triage notes


Were old charts reviewed (outside hosp., previous admission, EMS record, old 

EKG, old radiological studies, urgent care reports/EKG's, nursing home records)?

Report findings 


@  -See above


Differential Diagnosis (chest pain, altered mental status, abdominal pain women,

abdominal pain men, vaginal bleeding, musculoskeletal, weakness, fever, dyspnea,

syncope, headache, dizziness, GI bleed, back pain, seizure, CVA, palpatations, 

mental health)? 


@  -Differential Weakness:


Hypoglycemia, shock, sepsis, hyponatremia, anemia, infection, MI, ETOH, adverse 

medicine reaction, overdose, stroke, this is not meant to be an all-inclusive 

list.





EKG interpreted by me (3pts min.).


@  -My EKG interpretation: Ventricular rate 110, sinus tachycardia,.  125, QRS 

91, CO2 78. No AK prolongation, no QTC prolongation, no ST or T-wave changes 

noted.   Overall, this EKG is unremarkable





X-rays interpreted by me (1pt min.).


@  -Possible superimposed pneumonia on chest x-ray


CT interpreted by me (1pt min.).


@  -None done


U/S interpreted by me (1pt. min.).


@  -None done


What testing was considered but not performed or refused? (CT, X-rays, U/S, 

labs)? Why?


@  -None


What meds were considered but not given or refused? Why?


@  -None


Did you discuss the management of the patient with other professionals 

(professionals i.e. MICHELE Miranda, NP, lab, RT, psych nurse, , , 

teacher, , )? Give summary


@  -Discussed with Dr. Spencer who was patient's main hospitalist over his 

previous admission.  Dr. Valencia was contacted for admission.  Dr. Valencia had a

long conversation family and will be admitting patients to regular floor for 

comfort measures.


Was smoking cessation discussed for >3mins.?


@  -No


Was critical care preformed (if so, how long)?


@  -33 minutes


Were there social determinants of health that impacted care today? How? 

(Homelessness, low income, unemployed, alcoholism, drug addiction, 

transportation, low edu. Level, literacy, decrease access to med. care, [facility], 

rehab)?


@  -No


Was there de-escalation of care discussed even if they declined (Discuss DNR or 

withdrawal of care, Hospice)? DNR status


@  -see above


What co-morbidities impacted this encounter? (DM, HTN, Smoking, COPD, CAD, 

Cancer, CVA, ARF, Chemo, Hep., AIDS, mental health diagnosis, sleep apnea, 

morbid obesity)?


@  -None


Was patient admitted / discharged? Hospital course, mention meds given and 

route, prescriptions, significant lab abnormalities, going to OR and other 

pertinent info.


@  -72-year-old male with end-stage cancer diagnosis presents to the emergency 

department for altered mental status and shortness of breath.  Patient has 

extensive tumor burden.  Vital signs upon arrival shows tachycardia and 

hypertension.  Patient went initial arrival did request central venous catheter 

line placement and IV pressors.  He however still did not want CPR or 

intubation.  Laboratory evaluation shows slightly elevated leukocytosis.  

Hemoglobin 9.9 which is defined compared to his recent blood test.  INR 1.8 

which suggest liver failure.  Patient has sodium 128.  Elevated renal function 

with creatinine 2.69 recently as of yesterday was 0.72.  Acute acidosis of 11.5.

 Elevated liver markers..


Undiagnosed new problem with uncertain prognosis?


@  -No


Drug Therapy requiring intensive monitoring for toxicity (Heparin, Nitro, 

Insulin, Cardizem)?


@  -No


Were any procedures done?


@  -No


Diagnosis/symptom?  Acute, or Chronic, or Acute on Chronic?  Uncomplicated 

(without systemic symptoms) or Complicated (systemic symptoms)?


@  -1.  Cancer, end of life care


Side effects of treatment?


@  -No


Exacerbation, Progression, or Severe Exacerbation?


@  -No


Poses a threat to life or bodily function? How? (Chest pain, USA, MI, pneumonia,

PE, COPD, DKA, ARF, appy, cholecystitis, CVA, Diverticulitis, Homicidal, 

Suicidal, threat to staff... and all critical care pts)


@  -yes








- Lab Data


Result diagrams: 


                                 05/07/23 17:32





                                 05/07/23 17:32


                                   Lab Results











  05/07/23 05/07/23 05/07/23 Range/Units





  17:32 17:32 17:32 


 


WBC  25.0 H    (3.8-10.6)  k/uL


 


RBC  3.14 L    (4.30-5.90)  m/uL


 


Hgb  9.9 L    (13.0-17.5)  gm/dL


 


Hct  30.6 L    (39.0-53.0)  %


 


MCV  97.6    (80.0-100.0)  fL


 


MCH  31.4    (25.0-35.0)  pg


 


MCHC  32.2    (31.0-37.0)  g/dL


 


RDW  16.9 H    (11.5-15.5)  %


 


Plt Count  30 L    (150-450)  k/uL


 


MPV  13.6    


 


Neutrophils % (Manual)  62    %


 


Band Neuts % (Manual)  21    %


 


Lymphocytes % (Manual)  7    %


 


Monocytes % (Manual)  3    %


 


Metamyelocytes %  5    %


 


Myelocytes %  3    %


 


Neutrophils # (Manual)  20.70 H    (1.3-7.7)  k/uL


 


Lymphocytes # (Manual)  1.75    (1.0-4.8)  k/uL


 


Monocytes # (Manual)  0.75    (0-1.0)  k/uL


 


Metamyelocytes # (Man)  1.25 H    (0)  k/uL


 


Myelocytes # (Manual)  0.75 H    (0)  k/uL


 


Nucleated RBCs  17 H    (0-0)  /100 WBC


 


Manual Slide Review  Performed    


 


Polychromasia  Present    


 


Anisocytosis  Slight    


 


Anisocytosis (manual)  Present    


 


Macrocytosis  Slight    


 


PT     (9.0-12.0)  sec


 


INR     (<1.2)  


 


APTT     (22.0-30.0)  sec


 


Sodium   128 L   (137-145)  mmol/L


 


Potassium   5.8 H   (3.5-5.1)  mmol/L


 


Chloride   96 L   ()  mmol/L


 


Carbon Dioxide   13 L   (22-30)  mmol/L


 


Anion Gap   19   mmol/L


 


BUN   86 H   (9-20)  mg/dL


 


Creatinine   2.69 H   (0.66-1.25)  mg/dL


 


Est GFR (CKD-EPI)AfAm   26   (>60 ml/min/1.73 sqM)  


 


Est GFR (CKD-EPI)NonAf   23   (>60 ml/min/1.73 sqM)  


 


Glucose   155 H   (74-99)  mg/dL


 


Plasma Lactic Acid Koby    11.5 H*  (0.7-2.0)  mmol/L


 


Calcium   8.5   (8.4-10.2)  mg/dL


 


Ionized Calcium Glory   5.0   (4.5-5.3)  mg/dL


 


Magnesium   2.6 H   (1.6-2.3)  mg/dL


 


Total Bilirubin   8.2 H   (0.2-1.3)  mg/dL


 


AST   367 H   (17-59)  U/L


 


ALT   640 H   (4-49)  U/L


 


Alkaline Phosphatase   434 H   ()  U/L


 


Ammonia    <9  (<30)  umol/L


 


Troponin I     (0.000-0.034)  ng/mL


 


Total Protein   3.7 L   (6.3-8.2)  g/dL


 


Albumin   1.9 L   (3.5-5.0)  g/dL














  05/07/23 05/07/23 Range/Units





  17:32 17:32 


 


WBC    (3.8-10.6)  k/uL


 


RBC    (4.30-5.90)  m/uL


 


Hgb    (13.0-17.5)  gm/dL


 


Hct    (39.0-53.0)  %


 


MCV    (80.0-100.0)  fL


 


MCH    (25.0-35.0)  pg


 


MCHC    (31.0-37.0)  g/dL


 


RDW    (11.5-15.5)  %


 


Plt Count    (150-450)  k/uL


 


MPV    


 


Neutrophils % (Manual)    %


 


Band Neuts % (Manual)    %


 


Lymphocytes % (Manual)    %


 


Monocytes % (Manual)    %


 


Metamyelocytes %    %


 


Myelocytes %    %


 


Neutrophils # (Manual)    (1.3-7.7)  k/uL


 


Lymphocytes # (Manual)    (1.0-4.8)  k/uL


 


Monocytes # (Manual)    (0-1.0)  k/uL


 


Metamyelocytes # (Man)    (0)  k/uL


 


Myelocytes # (Manual)    (0)  k/uL


 


Nucleated RBCs    (0-0)  /100 WBC


 


Manual Slide Review    


 


Polychromasia    


 


Anisocytosis    


 


Anisocytosis (manual)    


 


Macrocytosis    


 


PT  17.9 H   (9.0-12.0)  sec


 


INR  1.8 H   (<1.2)  


 


APTT  23.4   (22.0-30.0)  sec


 


Sodium    (137-145)  mmol/L


 


Potassium    (3.5-5.1)  mmol/L


 


Chloride    ()  mmol/L


 


Carbon Dioxide    (22-30)  mmol/L


 


Anion Gap    mmol/L


 


BUN    (9-20)  mg/dL


 


Creatinine    (0.66-1.25)  mg/dL


 


Est GFR (CKD-EPI)AfAm    (>60 ml/min/1.73 sqM)  


 


Est GFR (CKD-EPI)NonAf    (>60 ml/min/1.73 sqM)  


 


Glucose    (74-99)  mg/dL


 


Plasma Lactic Acid Koby    (0.7-2.0)  mmol/L


 


Calcium    (8.4-10.2)  mg/dL


 


Ionized Calcium Glory    (4.5-5.3)  mg/dL


 


Magnesium    (1.6-2.3)  mg/dL


 


Total Bilirubin    (0.2-1.3)  mg/dL


 


AST    (17-59)  U/L


 


ALT    (4-49)  U/L


 


Alkaline Phosphatase    ()  U/L


 


Ammonia    (<30)  umol/L


 


Troponin I   <0.012  (0.000-0.034)  ng/mL


 


Total Protein    (6.3-8.2)  g/dL


 


Albumin    (3.5-5.0)  g/dL














Disposition


Clinical Impression: 


 Lung cancer, Respiratory failure





Disposition: ADMITTED AS IP TO THIS [facility]


Condition: Fair


Referrals: 


Anthony Oseguera MD [Primary Care Provider] - 1-2 days


Decision Time: 19:42

## 2023-05-08 RX ADMIN — PIPERACILLIN AND TAZOBACTAM SCH: 3; .375 INJECTION, POWDER, FOR SOLUTION INTRAVENOUS at 10:56

## 2023-05-08 RX ADMIN — DEXTROSE MONOHYDRATE AND SODIUM CHLORIDE SCH: 5; .9 INJECTION, SOLUTION INTRAVENOUS at 01:10

## 2023-05-08 NOTE — P.DS
Providers


Date of admission: 


23 19:37





Attending physician: 


Antolin Valencia MD





Consults: 





                                        





23 18:53


Consult Physician Routine 


   Consulting Provider: Rajiv Vasquez


   Consult Reason/Comments: cancer


   Do you want consulting provider notified?: Yes











Primary care physician: 


Anthony Oseguera





Hospital Course: 





Discharge Diagnosis:


Lung cancer





Hospital Course: 


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  Patient was brought into the ED because of 

shortness of breath.  Chest x-ray showed increasing size of right perihilar 

mass.  Patient and family are interested in comfort care.  Patient was started 

on morphine drip.  He passed away comfortably.  Time of death is 7:30 AM








Patient passed away before I saw him. 








A total of [5] minutes of time were spent preparing this complex discharge 

summary .


Patient Condition at Discharge: Critical





Plan - Discharge Summary


Discharge Rx Participant: No


New Discharge Prescriptions: 


No Action


   Latanoprost Ophth [Xalatan 0.005%] 1 drop BOTH EYES HS


   Levothyroxine Sodium [Synthroid] 50 mcg PO DAILY


   Albuterol Sulfate [Albuterol Sulfate Hfa] 1 - 2 puff INHALATION RT-Q6H PRN


     PRN Reason: Shortness Of Breath


   Fluticasone/Umeclidin/Vilanter [Trelegy Ellipta 100-62.5-25] 1 puff 

INHALATION RT-DAILY


   Demeclocycline [Declomycin] 150 mg PO BID #60 tab


   Ipratropium-Albuterol Nebulize [Duoneb 0.5 mg-3 mg/3 ml Soln] 3 ml INHALATION

RT-QID PRN #120 each


     PRN Reason: Shortness Of Breath Or Wheezing


   ALPRAZolam [Xanax] 0.25 mg PO TID PRN  tab


     PRN Reason: Anxiety


   HYDROcodone/APAP 5-325MG [Norco 5-325] 1 tab PO Q6HR PRN 3 Days #12 tab


     PRN Reason: Pain


   levETIRAcetam [Keppra] 500 mg PO Q12HR #60 tab


   Omeprazole 20 mg PO DAILY PRN


     PRN Reason: Gi Upset


   Aspirin 81 mg PO DAILY


Discharge Medication List





Latanoprost Ophth [Xalatan 0.005%] 1 drop BOTH EYES HS 23 [History]


levETIRAcetam [Keppra] 500 mg PO Q12HR #60 tab 23 [Rx]


Levothyroxine Sodium [Synthroid] 50 mcg PO DAILY 23 [History]


Albuterol Sulfate [Albuterol Sulfate Hfa] 1 - 2 puff INHALATION RT-Q6H PRN 

23 [History]


Fluticasone/Umeclidin/Vilanter [Trelegy Ellipta 100-62.5-25] 1 puff INHALATION 

RT-DAILY 23 [History]


Demeclocycline [Declomycin] 150 mg PO BID #60 tab 23 [Rx]


Ipratropium-Albuterol Nebulize [Duoneb 0.5 mg-3 mg/3 ml Soln] 3 ml INHALATION 

RT-QID PRN #120 each 23 [Rx]


Omeprazole 20 mg PO DAILY PRN 23 [History]


ALPRAZolam [Xanax] 0.25 mg PO TID PRN  tab 23 [Rx]


HYDROcodone/APAP 5-325MG [Norco 5-325] 1 tab PO Q6HR PRN 3 Days #12 tab 23

[Rx]


Aspirin 81 mg PO DAILY 23 [History]








Follow up Appointment(s)/Referral(s): 


Anthony Oseguera MD [Primary Care Provider] - 1-2 days


Discharge Disposition: 





- Preliminary Cause of Death


Preliminary Cause of Death: Lung cancer

## 2023-05-10 NOTE — CDI
Documentation Clarification Form



Date: 5/10/2023 2:09:45 PM

From: Shira Reese 

MRN: [id number]

Admit Date: 2023 7:37:00 PM

Patient Name: Lopez Acevedo

Visit Number: [id number]

Discharge Date:  2023 12:53:00 PM



ATTENTION: The Clinical Documentation Specialists (CDI) and [facility] Coding Staff 
appreciate your assistance in clarifying documentation. Please respond to the 
clarification below the line at the bottom and electronically sign. The CDI & 
[facility] Coding staff will review the response and follow-up if needed. Please note: 
Queries are made part of the Legal Health Record. If you have any questions, 
please contact the author of this message via ITS.



Dr. Antolin Valencia



Per ED notes Clinical impression  respiratory failure is documented.  Your 
patient has shortness of breath with O2 of 86% on RA with lung cancer.   Based 
on this information and the findings below, is there an additional diagnosis 
that is clinically appropriate for this patient?



History/Risk Factors: 

Tobacco use:   positive for tobacco

Home oxygen:  



Clinical Indicators: 

Vital signs:  97.7  F,  120 bpm,   42 R,   84/54,   86 RA 



Pulse oximetry:  86

Lung/Breathing assessment:  Patient decided on comfort measures and .

ABG/CBG:  pH      pO2             pCO2      Lactate



Treatment:  6L  NC           Patient comfort measures and DNR

Breathing tx

Continuous Pulse ox

O2/Vent/BiPap  6L NC



Is there an additional diagnosis that is clinically appropriate for this 
patient?

[  ] Acute Hypoxic Respiratory Failure (pO2 <60 mm Hg or SpO2 <91% on room air)

[  ] Acute Hypercapnic Respiratory Failure (pCO2 >50 and pH <7.35)

[  ] Acute on Chronic Respiratory Failure

[  ] Chronic Respiratory Failure

[  ] Acute Respiratory Distress

[  ] Acute Respiratory Insufficiency

[  ] Other Diagnosis, please specify _____

[  ] Unable to determine







___________________________________________________________________________

stage 4 lung cancer with mets to brain and liver, patient admitted as hospice, 
and comfort care management 

MTDD

## 2023-05-10 NOTE — CDI
Documentation Clarification Form



Date: 5/10/2023 2:01:12 PM

From: Shira Reese

MRN: [id number]

Admit Date: 5/7/2023 7:37:00 PM

Patient Name: Lopez Acevedo

Visit Number: [id number]

Discharge Date:  5/8/2023 12:53:00 PM





ATTENTION: The Clinical Documentation Specialists (CDI) and [facility] Coding Staff 
appreciate your assistance in clarifying documentation. Please respond to the 
clarification below the line at the bottom and electronically sign. The CDI & 
[facility] Coding staff will review the response and follow-up if needed. Please note: 
Queries are made part of the Legal Health Record. If you have any questions, 
please contact the author of this message via ITS.



Dr. Antolin Valencia





Per ED note Sepsis focused Exam #1 Sepsis criteria met at 18:36.  Documentation 
of sepsis not carried through chart.  Based on this information and the findings
below, is there an additional diagnosis that is clinically appropriate for this 
patient?



History/Risk Factors:  Lung CA with liver and brain mets



Clinical Indicators:

WBC      25  

Lactic acid:  11.5

Vitals signs:  97.7 F,  120 bpm,  42,  84/54,   86% RA



Treatment:  Zosyn

ID Consult:  Patient decided on comfort measures



Is there an additional diagnosis that is clinically appropriate for this 
patient?

[  ] Sepsis, present on admission

[  ] Sepsis ruled out

[  ] Severe Sepsis with organ failure

[  ] Septic Shock

[  ] SIRS, without underlying infectious process

[  ] Other, please specify  _____________

[  ] Unable to determine



SIRS Criteria: 2 or more of the following may indicate SIRS   

Temperature         < 96.8F (36C) or > 101.0F (38.3C)

Heart Rate         > 90 bpm

Respiratory Rate      > 20 breaths/min or PaCO2 < 32 mmHg

White Blood Cell Count   > 12,000 or < 4,000 cells/mm3 or > 10% bands



___________________________________________________________________________

patient admitted as hospice , comfort care measures were applied

MTDD